# Patient Record
Sex: MALE | Race: WHITE | Employment: UNEMPLOYED | URBAN - METROPOLITAN AREA
[De-identification: names, ages, dates, MRNs, and addresses within clinical notes are randomized per-mention and may not be internally consistent; named-entity substitution may affect disease eponyms.]

---

## 2017-06-26 PROBLEM — F15.10 METHAMPHETAMINE ABUSE (HCC): Status: ACTIVE | Noted: 2017-06-26

## 2017-06-26 PROBLEM — F60.2 ANTISOCIAL PERSONALITY DISORDER (HCC): Chronic | Status: ACTIVE | Noted: 2017-06-26

## 2017-06-26 PROBLEM — F31.62 BIPOLAR DISORDER, CURRENT EPISODE MIXED, MODERATE (HCC): Status: ACTIVE | Noted: 2017-06-26

## 2017-06-26 PROBLEM — F39 MOOD DISORDER (HCC): Chronic | Status: ACTIVE | Noted: 2017-06-26

## 2018-07-17 ENCOUNTER — HOSPITAL ENCOUNTER (INPATIENT)
Age: 33
LOS: 1 days | Discharge: HOME | End: 2018-07-19
Attending: EMERGENCY MEDICINE | Admitting: PSYCHIATRY & NEUROLOGY
Payer: MEDICAID

## 2018-07-17 DIAGNOSIS — T50.902A INTENTIONAL DRUG OVERDOSE, INITIAL ENCOUNTER (HCC): ICD-10-CM

## 2018-07-17 DIAGNOSIS — R45.851 SUICIDAL IDEATION: Primary | ICD-10-CM

## 2018-07-17 LAB
BASOPHILS ABSOLUTE: 0.1 K/UL (ref 0–0.2)
BASOPHILS RELATIVE PERCENT: 0.9 %
EOSINOPHILS ABSOLUTE: 0.1 K/UL (ref 0–0.6)
EOSINOPHILS RELATIVE PERCENT: 1.1 %
HCT VFR BLD CALC: 43.9 % (ref 40.5–52.5)
HEMOGLOBIN: 15 G/DL (ref 13.5–17.5)
LYMPHOCYTES ABSOLUTE: 4 K/UL (ref 1–5.1)
LYMPHOCYTES RELATIVE PERCENT: 35.2 %
MCH RBC QN AUTO: 32.1 PG (ref 26–34)
MCHC RBC AUTO-ENTMCNC: 34.2 G/DL (ref 31–36)
MCV RBC AUTO: 93.7 FL (ref 80–100)
MONOCYTES ABSOLUTE: 0.8 K/UL (ref 0–1.3)
MONOCYTES RELATIVE PERCENT: 7.1 %
NEUTROPHILS ABSOLUTE: 6.4 K/UL (ref 1.7–7.7)
NEUTROPHILS RELATIVE PERCENT: 55.7 %
PDW BLD-RTO: 13.3 % (ref 12.4–15.4)
PLATELET # BLD: 269 K/UL (ref 135–450)
PMV BLD AUTO: 8.3 FL (ref 5–10.5)
RBC # BLD: 4.68 M/UL (ref 4.2–5.9)
WBC # BLD: 11.4 K/UL (ref 4–11)

## 2018-07-17 PROCEDURE — G0480 DRUG TEST DEF 1-7 CLASSES: HCPCS

## 2018-07-17 PROCEDURE — 99285 EMERGENCY DEPT VISIT HI MDM: CPT

## 2018-07-17 PROCEDURE — 81003 URINALYSIS AUTO W/O SCOPE: CPT

## 2018-07-17 PROCEDURE — 80307 DRUG TEST PRSMV CHEM ANLYZR: CPT

## 2018-07-17 PROCEDURE — 80053 COMPREHEN METABOLIC PANEL: CPT

## 2018-07-17 PROCEDURE — 85025 COMPLETE CBC W/AUTO DIFF WBC: CPT

## 2018-07-18 LAB
A/G RATIO: 1.1 (ref 1.1–2.2)
A/G RATIO: 1.3 (ref 1.1–2.2)
ACETAMINOPHEN LEVEL: <5 UG/ML (ref 10–30)
ALBUMIN SERPL-MCNC: 3.8 G/DL (ref 3.4–5)
ALBUMIN SERPL-MCNC: 3.9 G/DL (ref 3.4–5)
ALP BLD-CCNC: 121 U/L (ref 40–129)
ALP BLD-CCNC: 125 U/L (ref 40–129)
ALT SERPL-CCNC: 438 U/L (ref 10–40)
ALT SERPL-CCNC: 451 U/L (ref 10–40)
AMPHETAMINE SCREEN, URINE: NORMAL
ANION GAP SERPL CALCULATED.3IONS-SCNC: 14 MMOL/L (ref 3–16)
ANION GAP SERPL CALCULATED.3IONS-SCNC: 14 MMOL/L (ref 3–16)
AST SERPL-CCNC: 295 U/L (ref 15–37)
AST SERPL-CCNC: 307 U/L (ref 15–37)
BARBITURATE SCREEN URINE: NORMAL
BENZODIAZEPINE SCREEN, URINE: NORMAL
BILIRUB SERPL-MCNC: <0.2 MG/DL (ref 0–1)
BILIRUB SERPL-MCNC: <0.2 MG/DL (ref 0–1)
BILIRUBIN URINE: NEGATIVE
BLOOD, URINE: NEGATIVE
BUN BLDV-MCNC: 12 MG/DL (ref 7–20)
BUN BLDV-MCNC: 13 MG/DL (ref 7–20)
CALCIUM SERPL-MCNC: 8.6 MG/DL (ref 8.3–10.6)
CALCIUM SERPL-MCNC: 8.7 MG/DL (ref 8.3–10.6)
CANNABINOID SCREEN URINE: NORMAL
CHLORIDE BLD-SCNC: 104 MMOL/L (ref 99–110)
CHLORIDE BLD-SCNC: 106 MMOL/L (ref 99–110)
CLARITY: CLEAR
CO2: 21 MMOL/L (ref 21–32)
CO2: 22 MMOL/L (ref 21–32)
COCAINE METABOLITE SCREEN URINE: NORMAL
COLOR: YELLOW
CREAT SERPL-MCNC: 1.4 MG/DL (ref 0.9–1.3)
CREAT SERPL-MCNC: 1.4 MG/DL (ref 0.9–1.3)
ETHANOL: 171 MG/DL (ref 0–0.08)
ETHANOL: 224 MG/DL (ref 0–0.08)
ETHANOL: 50 MG/DL (ref 0–0.08)
GFR AFRICAN AMERICAN: >60
GFR AFRICAN AMERICAN: >60
GFR NON-AFRICAN AMERICAN: 59
GFR NON-AFRICAN AMERICAN: 59
GLOBULIN: 3.1 G/DL
GLOBULIN: 3.4 G/DL
GLUCOSE BLD-MCNC: 103 MG/DL (ref 70–99)
GLUCOSE BLD-MCNC: 97 MG/DL (ref 70–99)
GLUCOSE URINE: NEGATIVE MG/DL
KETONES, URINE: NEGATIVE MG/DL
LEUKOCYTE ESTERASE, URINE: NEGATIVE
Lab: NORMAL
METHADONE SCREEN, URINE: NORMAL
MICROSCOPIC EXAMINATION: NORMAL
NITRITE, URINE: NEGATIVE
OPIATE SCREEN URINE: NORMAL
OXYCODONE URINE: NORMAL
PH UA: 6
PH UA: 6
PHENCYCLIDINE SCREEN URINE: NORMAL
POTASSIUM SERPL-SCNC: 4.3 MMOL/L (ref 3.5–5.1)
POTASSIUM SERPL-SCNC: 4.6 MMOL/L (ref 3.5–5.1)
PROPOXYPHENE SCREEN: NORMAL
PROTEIN UA: NEGATIVE MG/DL
SALICYLATE, SERUM: <0.3 MG/DL (ref 15–30)
SODIUM BLD-SCNC: 140 MMOL/L (ref 136–145)
SODIUM BLD-SCNC: 141 MMOL/L (ref 136–145)
SPECIFIC GRAVITY UA: 1.01
TOTAL PROTEIN: 7 G/DL (ref 6.4–8.2)
TOTAL PROTEIN: 7.2 G/DL (ref 6.4–8.2)
URINE REFLEX TO CULTURE: NORMAL
URINE TYPE: NORMAL
UROBILINOGEN, URINE: 0.2 E.U./DL

## 2018-07-18 PROCEDURE — G0480 DRUG TEST DEF 1-7 CLASSES: HCPCS

## 2018-07-18 PROCEDURE — 80053 COMPREHEN METABOLIC PANEL: CPT

## 2018-07-18 PROCEDURE — 93005 ELECTROCARDIOGRAM TRACING: CPT | Performed by: EMERGENCY MEDICINE

## 2018-07-18 PROCEDURE — 93010 ELECTROCARDIOGRAM REPORT: CPT | Performed by: INTERNAL MEDICINE

## 2018-07-18 PROCEDURE — 6360000002 HC RX W HCPCS

## 2018-07-18 PROCEDURE — 1240000000 HC EMOTIONAL WELLNESS R&B

## 2018-07-18 PROCEDURE — 6360000002 HC RX W HCPCS: Performed by: EMERGENCY MEDICINE

## 2018-07-18 RX ORDER — LORAZEPAM 2 MG/1
2 TABLET ORAL
Status: DISCONTINUED | OUTPATIENT
Start: 2018-07-18 | End: 2018-07-18

## 2018-07-18 RX ORDER — NICOTINE 21 MG/24HR
1 PATCH, TRANSDERMAL 24 HOURS TRANSDERMAL DAILY
Status: DISCONTINUED | OUTPATIENT
Start: 2018-07-18 | End: 2018-07-19 | Stop reason: HOSPADM

## 2018-07-18 RX ORDER — LORAZEPAM 2 MG/1
2 TABLET ORAL
Status: DISCONTINUED | OUTPATIENT
Start: 2018-07-18 | End: 2018-07-19 | Stop reason: HOSPADM

## 2018-07-18 RX ORDER — OLANZAPINE 10 MG/1
10 TABLET ORAL EVERY 8 HOURS PRN
Status: DISCONTINUED | OUTPATIENT
Start: 2018-07-18 | End: 2018-07-19 | Stop reason: HOSPADM

## 2018-07-18 RX ORDER — LORAZEPAM 2 MG/ML
INJECTION INTRAMUSCULAR
Status: COMPLETED
Start: 2018-07-18 | End: 2018-07-18

## 2018-07-18 RX ORDER — HALOPERIDOL 5 MG/ML
INJECTION INTRAMUSCULAR
Status: DISCONTINUED
Start: 2018-07-18 | End: 2018-07-18 | Stop reason: WASHOUT

## 2018-07-18 RX ORDER — LORAZEPAM 2 MG/1
4 TABLET ORAL
Status: DISCONTINUED | OUTPATIENT
Start: 2018-07-18 | End: 2018-07-19 | Stop reason: HOSPADM

## 2018-07-18 RX ORDER — OLANZAPINE 10 MG/1
10 INJECTION, POWDER, LYOPHILIZED, FOR SOLUTION INTRAMUSCULAR EVERY 8 HOURS PRN
Status: DISCONTINUED | OUTPATIENT
Start: 2018-07-18 | End: 2018-07-19 | Stop reason: HOSPADM

## 2018-07-18 RX ORDER — LORAZEPAM 1 MG/1
1 TABLET ORAL
Status: DISCONTINUED | OUTPATIENT
Start: 2018-07-18 | End: 2018-07-19 | Stop reason: HOSPADM

## 2018-07-18 RX ORDER — ACETAMINOPHEN 325 MG/1
650 TABLET ORAL EVERY 4 HOURS PRN
Status: DISCONTINUED | OUTPATIENT
Start: 2018-07-18 | End: 2018-07-19 | Stop reason: HOSPADM

## 2018-07-18 RX ORDER — DIPHENHYDRAMINE HYDROCHLORIDE 50 MG/ML
INJECTION INTRAMUSCULAR; INTRAVENOUS
Status: DISCONTINUED
Start: 2018-07-18 | End: 2018-07-18 | Stop reason: WASHOUT

## 2018-07-18 RX ORDER — LORAZEPAM 2 MG/ML
1 INJECTION INTRAMUSCULAR ONCE
Status: COMPLETED | OUTPATIENT
Start: 2018-07-18 | End: 2018-07-18

## 2018-07-18 RX ORDER — HALOPERIDOL 5 MG/ML
10 INJECTION INTRAMUSCULAR ONCE
Status: COMPLETED | OUTPATIENT
Start: 2018-07-18 | End: 2018-07-18

## 2018-07-18 RX ORDER — HYDROXYZINE PAMOATE 50 MG/1
50 CAPSULE ORAL 3 TIMES DAILY PRN
Status: DISCONTINUED | OUTPATIENT
Start: 2018-07-18 | End: 2018-07-19 | Stop reason: HOSPADM

## 2018-07-18 RX ADMIN — HALOPERIDOL LACTATE 10 MG: 5 INJECTION, SOLUTION INTRAMUSCULAR at 00:46

## 2018-07-18 RX ADMIN — LORAZEPAM 1 MG: 2 INJECTION INTRAMUSCULAR; INTRAVENOUS at 00:46

## 2018-07-18 RX ADMIN — LORAZEPAM 1 MG: 2 INJECTION INTRAMUSCULAR at 00:46

## 2018-07-18 ASSESSMENT — SLEEP AND FATIGUE QUESTIONNAIRES
DIFFICULTY ARISING: NO
AVERAGE NUMBER OF SLEEP HOURS: 3
RESTFUL SLEEP: NO
DO YOU USE A SLEEP AID: NO
DIFFICULTY STAYING ASLEEP: YES
DIFFICULTY FALLING ASLEEP: YES
SLEEP PATTERN: DIFFICULTY FALLING ASLEEP;INSOMNIA;DISTURBED/INTERRUPTED SLEEP;RESTLESSNESS
DO YOU HAVE DIFFICULTY SLEEPING: YES

## 2018-07-18 ASSESSMENT — LIFESTYLE VARIABLES: HISTORY_ALCOHOL_USE: YES

## 2018-07-18 NOTE — H&P
Spoke with patient at bedside and explained the purpose of my visit. Pt stated he did not want a physical exam and refused to give any information regarding medical history. Explained to patient that should he have any concerns during his admission to [facility] that he may contact his nurse and have them contact this provider. Should any medical concerns arise during his admission, I may contact us at any time.     Para Ora SMITH 5:11 PM 7/18/2018

## 2018-07-18 NOTE — PROGRESS NOTES
Restraint 1 Hour RN assessment      Catina Shay was placed in Leather/Alternative Bilateral Wrist and Leather/Alternative Bilateral Ankle restraints at 0050 due to Behavioral management problems. Currently patient is yelling out and belligerent. He is trying to sit up and break out of restraints. He has reacted poorly to being placed in restraints. Patient medical history includes       Diagnosis Date    ADHD     Anxiety     BIPOLAR AFFECTIVE DISORDER     Depression     Hepatitis C     IV drug user     Kidney infection     with consideration given to all history in regards to restraint risk. Current mental status alert, oriented to self. Pt is drunk. . At this time the patient  does meet criteria for continued restraint AEB Combative behavior. Threatening to shoot the . The room has been assessed for safety and potentially harmful objects. Patient has been assessed for comfort and current needs. Respirations easy. Skin Skin color, tempature, turgor normal. No rashes or lesions. Current vitals include BP (!) 158/100   Pulse 105   Temp 97.6 °F (36.4 °C) (Oral)   Resp 16   Ht 6' 2\" (1.88 m)   Wt 200 lb (90.7 kg)   SpO2 95%   BMI 25.68 kg/m²   .     Most recent lab values include:   Admission on 07/17/2018   Component Date Value Ref Range Status    WBC 07/17/2018 11.4* 4.0 - 11.0 K/uL Final    RBC 07/17/2018 4.68  4.20 - 5.90 M/uL Final    Hemoglobin 07/17/2018 15.0  13.5 - 17.5 g/dL Final    Hematocrit 07/17/2018 43.9  40.5 - 52.5 % Final    MCV 07/17/2018 93.7  80.0 - 100.0 fL Final    MCH 07/17/2018 32.1  26.0 - 34.0 pg Final    MCHC 07/17/2018 34.2  31.0 - 36.0 g/dL Final    RDW 07/17/2018 13.3  12.4 - 15.4 % Final    Platelets 86/37/6748 269  135 - 450 K/uL Final    MPV 07/17/2018 8.3  5.0 - 10.5 fL Final    Neutrophils % 07/17/2018 55.7  % Final    Lymphocytes % 07/17/2018 35.2  % Final    Monocytes % 07/17/2018 7.1  % Final    Eosinophils % 07/17/2018 1.1

## 2018-07-18 NOTE — ED TRIAGE NOTES
Chief Complaint   Patient presents with    Suicidal     Pt brought in by EMS/police on hold for suicidal attempt. Pt states that he was trying to kill himself tonight because his dad  a year ago from lung cancer. Pt states that he took 30, 800mg of ibuprofen and drank 15 beers. Pt states that he just wanted to die.

## 2018-07-18 NOTE — ED PROVIDER NOTES
Triage Chief Complaint:   Suicidal (Pt brought in by EMS/police on hold for suicidal attempt. Pt states that he was trying to kill himself tonight because his dad  a year ago from lung cancer. Pt states that he took 30, 800mg of ibuprofen and drank 15 beers. Pt states that he just wanted to die. )      [place]:  Migel Dawson is a 34 y.o. male that presents after an overdose of ibuprofen. He states that he took this 2 hours prior to arrival in the emergency department in a suicide attempt because this is the anniversary of his father's death ago. Patient has had suicide attempts in the past and is also an alcoholic and drug addict. He has hepatitis C from heroin use but states that he has not used heroin over the past 4 years but does drink 24 beers a day. He is nauseous but has not vomited did not have a fever and was asymptomatic at time of exam though he did appear somewhat inebriated and was very jovial.  Patient is hypertensive but not taking blood pressure medicines and neither is he taking his bipolar medications. ROS:  Review of systems was reviewed for 10 systems and is otherwise negative except as in the [address]    Past Medical History:   Diagnosis Date    ADHD     Anxiety     BIPOLAR AFFECTIVE DISORDER     Depression     Hepatitis C     IV drug user     Kidney infection      Past Surgical History:   Procedure Laterality Date    KIDNEY SURGERY      \"BORN WITHOUT ANY URETERAL VALVES. \"    TESTICLE REMOVAL      left     Family History   Problem Relation Age of Onset    Depression Father     Mental Illness Father     Cancer Father     Cancer Mother      Social History     Social History    Marital status: Single     Spouse name: N/A    Number of children: N/A    Years of education: N/A     Occupational History    unemployed      Social History Main Topics    Smoking status: Current Every Day Smoker     Packs/day: 1.00     Years: 12.00     Types: Cigarettes    Smokeless tobacco: Never Used      Comment: handouts given    Alcohol use 2.4 - 3.0 oz/week     4 - 5 Cans of beer per week    Drug use: No      Comment: Meth about once a month.  Sexual activity: Yes     Partners: Female     Other Topics Concern    Not on file     Social History Narrative    No narrative on file     No current facility-administered medications for this encounter. Current Outpatient Prescriptions   Medication Sig Dispense Refill    OLANZapine zydis (ZYPREXA) 10 MG disintegrating tablet Take 1 tablet by mouth 2 times daily 60 tablet 0     No Known Allergies  Nursing Notes Reviewed    Physical Exam:  ED Triage Vitals [07/17/18 2314]   Enc Vitals Group      BP (!) 158/100      Pulse 105      Resp 16      Temp 97.6 °F (36.4 °C)      Temp Source Oral      SpO2 95 %      Weight 200 lb (90.7 kg)      Height 6' 2\" (1.88 m)      Head Circumference       Peak Flow       Pain Score       Pain Loc       Pain Edu? Excl. in [address]? GENERAL APPEARANCE: A Disheveled 82-year-old white male in   HEAD: Normocephalic, atraumatic  EYES: Sclera anicteric.no conjunctival injection, PERRL EOM's  Intact  ENT: Mucous membranes moist, no nasal discharge, no stridor, dysphonia, dysphagia or trismus  HEART: RRR without rubs murmurs or gallops  LUNGS:  Clear good air movement no wheezing no retraction or accessory muscle use,  ABDOMEN: Soft, non-tender to palpation, no guarding or rebound. , no mass or distention and no hepatosplenomegaly. Clearly no evidence of an acute abdomen or peritoneal signs at time of exam  EXTREMITIES: No acute deformities, no peripheral edema  SKIN: Warm and dry.  Normal color, no rash,  capillary refill less than 2 seconds  MENTAL STATUS: Alert, oriented, interactive,   PSYCHIATRIC:   Level of consciousness: awake, and alert, slurred speech  Appearance: Disheveled but otherwise well-developed, well-nourished  Behavior & Motor: no abnormalities noted  Attitude: cooperative, attentive and good eye contact  Speech: spontaneous, normal rate, volume and articulation, but slightly slurred speech  Mood: dysthymic  Affect: mood congruent   Anxiety: mild  Hallucinations: Absent  Thought processes: Concentration & Attention were age appropriate  Thought content:    -  Delusions: none   -  OCD: none   -  Insight: poor insight and judgment    -  Cognition: oriented to person, place, and time    -  Fund of Knowledge: average IQ:average    -  Memory: intact    -  Suicide: Plans to kill himself by overdose   -  Homicide: no homicidal ideation  Sleep: no sleep difficulties  Appetite: normal         Nursing note and vital signs reviewed     I have reviewed and interpreted all of the currently available lab results from this visit (if applicable):  Results for orders placed or performed during the hospital encounter of 07/17/18   CBC auto differential   Result Value Ref Range    WBC 11.4 (H) 4.0 - 11.0 K/uL    RBC 4.68 4.20 - 5.90 M/uL    Hemoglobin 15.0 13.5 - 17.5 g/dL    Hematocrit 43.9 40.5 - 52.5 %    MCV 93.7 80.0 - 100.0 fL    MCH 32.1 26.0 - 34.0 pg    MCHC 34.2 31.0 - 36.0 g/dL    RDW 13.3 12.4 - 15.4 %    Platelets 223 817 - 687 K/uL    MPV 8.3 5.0 - 10.5 fL    Neutrophils % 55.7 %    Lymphocytes % 35.2 %    Monocytes % 7.1 %    Eosinophils % 1.1 %    Basophils % 0.9 %    Neutrophils # 6.4 1.7 - 7.7 K/uL    Lymphocytes # 4.0 1.0 - 5.1 K/uL    Monocytes # 0.8 0.0 - 1.3 K/uL    Eosinophils # 0.1 0.0 - 0.6 K/uL    Basophils # 0.1 0.0 - 0.2 K/uL   Comprehensive metabolic panel   Result Value Ref Range    Sodium 140 136 - 145 mmol/L    Potassium 4.6 3.5 - 5.1 mmol/L    Chloride 104 99 - 110 mmol/L    CO2 22 21 - 32 mmol/L    Anion Gap 14 3 - 16    Glucose 103 (H) 70 - 99 mg/dL    BUN 12 7 - 20 mg/dL    CREATININE 1.4 (H) 0.9 - 1.3 mg/dL    GFR Non- 59 (A) >60    GFR African American >60 >60    Calcium 8.6 8.3 - 10.6 mg/dL    Total Protein 7.0 6.4 - 8.2 g/dL    Alb 3.9 3.4 - 5.0 g/dL    Albumin/Globulin Ratio 1.3 1.1 - 2.2    Total Bilirubin <0.2 0.0 - 1.0 mg/dL    Alkaline Phosphatase 125 40 - 129 U/L     (H) 10 - 40 U/L     (H) 15 - 37 U/L    Globulin 3.1 g/dL   Ethanol   Result Value Ref Range    Ethanol Lvl 224 mg/dL   Acetaminophen level   Result Value Ref Range    Acetaminophen Level <5 (L) 10 - 30 ug/mL   Salicylate   Result Value Ref Range    Salicylate, Serum <1.1 (L) 15.0 - 30.0 mg/dL   Urine, reflex to culture   Result Value Ref Range    Color, UA Yellow Straw/Yellow    Clarity, UA Clear Clear    Glucose, Ur Negative Negative mg/dL    Bilirubin Urine Negative Negative    Ketones, Urine Negative Negative mg/dL    Specific Gravity, UA 1.010 1.005 - 1.030    Blood, Urine Negative Negative    pH, UA 6.0 5.0 - 8.0    Protein, UA Negative Negative mg/dL    Urobilinogen, Urine 0.2 <2.0 E.U./dL    Nitrite, Urine Negative Negative    Leukocyte Esterase, Urine Negative Negative    Microscopic Examination Not Indicated     Urine Reflex to Culture Not Indicated     Urine Type Not Specified    Drug screen multi urine   Result Value Ref Range    Amphetamine Screen, Urine Neg Negative <1000ng/mL    Barbiturate Screen, Ur Neg Negative <200 ng/mL    Benzodiazepine Screen, Urine Neg Negative <200 ng/mL    Cannabinoid Scrn, Ur Neg Negative <50 ng/mL    Cocaine Metabolite Screen, Urine Neg Negative <300 ng/mL    Opiate Scrn, Ur Neg Negative <300 ng/mL    PCP Screen, Urine Neg Negative <25 ng/mL    Methadone Screen, Urine Neg Negative <300 ng/mL    Propoxyphene Scrn, Ur Neg Negative <300 ng/mL    pH, UA 6.0     Drug Screen Comment: see below     Oxycodone Urine Neg Negative <100 ng/ml   Comprehensive metabolic panel   Result Value Ref Range    Sodium 141 136 - 145 mmol/L    Potassium 4.3 3.5 - 5.1 mmol/L    Chloride 106 99 - 110 mmol/L    CO2 21 21 - 32 mmol/L    Anion Gap 14 3 - 16    Glucose 97 70 - 99 mg/dL    BUN 13 7 - 20 mg/dL    CREATININE 1.4 (H) 0.9 - 1.3 mg/dL    GFR Non- 59 (A) >60 specified. Disposition medications (if applicable):  New Prescriptions    No medications on file       Comment: Please note this report has been produced using speech recognition software and may contain errors related to that system including errors in grammar, punctuation, and spelling, as well as words and phrases that may be inappropriate. If there are any questions or concerns please feel free to contact the dictating provider for clarification.       (Please note that portions of this note may have been completed with a voice recognition program. Efforts were made to edit the dictations but occasionally words are mis-transcribed.)    MD Casper Goss MD  07/18/18 0302       Casper Ribeiro MD  07/18/18 4261

## 2018-07-18 NOTE — ED NOTES
Patient awoken up to bathroom. Will order patient breakfast. Will continue to monitor patient. Will recheck blood alcohol level once patient eats. Will than evaluate patient.       Kell Dupree RN  07/18/18 6656
Patient reports he now wants to go home. Explained to patient the doctor wants him to stay. This nurse clarified after reading in history about drug and alcohol use if he drinks daily. Patient at first said no than stated yes. Patient reports drinking about 15 beers a day. Patient denies any serious withdrawal symptoms. Reports he has had shakes in past. Will notify Dr. Erasmo Gonzalez.       Dalton Kennedy RN  07/18/18 8070
Pt in bed resting at this time ; resps even and easy. Will continue to monitor.       Jarred Shipley  07/18/18 1090
Pt in bed sleeping at this time, no signs of distress. Will continue to monitor.       Wolfgang Jesi  07/18/18 0701
Pt resting comfortably at this time. Will continue to monitor.       [address], [place]  07/18/18 5564
Pt transported via wheelchair to [facility]. Pt report given to Giuliano Salter RN. Security at bedside. Pt uncooperative with staff.       Shawna Stewart RN  07/18/18 5174
Writer spoke with poison control regarding patient. Recommend that patient be observed for at least 6 hours. Pt is also to have repeat CMP in 4 hours. Will make Dr. Rob Denis aware.       Laure Ennis, MARIELLE  07/18/18 0000
place   [x]  Medication noncompliance   [x]  No collateral information to support safety   []      PROTECTIVE FACTORS:  [x] Age >25 and <55  [] Female gender   [] Denies depression  [] Denies suicidal ideation  [] Does not have lethal plan   [x] Does not have access to guns or weapons  [] Patient is verbally rolan for safety  [] No prior suicide attempts  [x] No active substance abuse- per patient  [] Patient has social or family support  [x] No active psychosis or cognitive dysfunction  [x] Physically healthy  [] Has outpatient services in place  [] Compliant with recommended medications  [] Collateral information from  supports patient safety   [] Patient is future oriented   []        Clinical Summary:    Patient presents to ED on a SOB which states \" On 7/7/18 at approximately 2230 hours I responded to [address] [place] [address] for a subject threatening suicide. The male , Shannan Belcher was on the front porch drinking. The male, Myah Tyson, stated that he is depressed because the anniversary of his father's death is coming. Myah Tyson stated multiple times that he wanted to die and took 25 800 mg Ibuprofen and stated he was attempting to kill himself. Myah Tyson also advised that wants to kill himself because he is a meth addict and if he kills himself he wont relapse. Patient was clinically sober at the time of the evaluation. Patient reports he is still suicidal. Reports he is depressed. Patient does not have currently plan however OD last night. Reports he lost his dad last year and he has no other support. Patient poor historian and not wanting to talk with this nurse much. Reports he is currently living with his mom whom he does not have a good relationship with. Reports he does not have much support. Reports he does not work and is on disability. Reports he currently is not using drugs. Patient not willing to talk about his past drug use. Patient was evaluated and offered supportive counseling.   Will continue to

## 2018-07-19 VITALS
BODY MASS INDEX: 25.67 KG/M2 | TEMPERATURE: 97.8 F | DIASTOLIC BLOOD PRESSURE: 76 MMHG | WEIGHT: 200 LBS | SYSTOLIC BLOOD PRESSURE: 111 MMHG | RESPIRATION RATE: 16 BRPM | HEIGHT: 74 IN | HEART RATE: 95 BPM | OXYGEN SATURATION: 99 %

## 2018-07-19 LAB
AMMONIA: 44 UMOL/L (ref 16–60)
AMYLASE: 59 U/L (ref 25–115)
EKG ATRIAL RATE: 88 BPM
EKG DIAGNOSIS: NORMAL
EKG P AXIS: 53 DEGREES
EKG P-R INTERVAL: 132 MS
EKG Q-T INTERVAL: 334 MS
EKG QRS DURATION: 90 MS
EKG QTC CALCULATION (BAZETT): 404 MS
EKG R AXIS: 38 DEGREES
EKG T AXIS: 43 DEGREES
EKG VENTRICULAR RATE: 88 BPM
LIPASE: 21 U/L (ref 13–60)
RPR: NORMAL
TSH SERPL DL<=0.05 MIU/L-ACNC: 2 UIU/ML (ref 0.27–4.2)
VITAMIN B-12: 996 PG/ML (ref 211–911)

## 2018-07-19 PROCEDURE — 86592 SYPHILIS TEST NON-TREP QUAL: CPT

## 2018-07-19 PROCEDURE — 5130000000 HC BRIDGE APPOINTMENT

## 2018-07-19 PROCEDURE — 84443 ASSAY THYROID STIM HORMONE: CPT

## 2018-07-19 PROCEDURE — 36415 COLL VENOUS BLD VENIPUNCTURE: CPT

## 2018-07-19 PROCEDURE — 99223 1ST HOSP IP/OBS HIGH 75: CPT | Performed by: PSYCHIATRY & NEUROLOGY

## 2018-07-19 PROCEDURE — 82140 ASSAY OF AMMONIA: CPT

## 2018-07-19 PROCEDURE — 82607 VITAMIN B-12: CPT

## 2018-07-19 PROCEDURE — 82150 ASSAY OF AMYLASE: CPT

## 2018-07-19 PROCEDURE — 83690 ASSAY OF LIPASE: CPT

## 2018-07-19 RX ORDER — OLANZAPINE 10 MG/1
10 TABLET ORAL NIGHTLY
Qty: 30 TABLET | Refills: 0 | Status: SHIPPED | OUTPATIENT
Start: 2018-07-19 | End: 2019-09-15

## 2018-07-19 ASSESSMENT — SLEEP AND FATIGUE QUESTIONNAIRES
SLEEP PATTERN: DIFFICULTY FALLING ASLEEP;DIFFICULTY ARISING;DISTURBED/INTERRUPTED SLEEP;INSOMNIA
DIFFICULTY FALLING ASLEEP: YES
RESTFUL SLEEP: NO
DO YOU HAVE DIFFICULTY SLEEPING: YES
AVERAGE NUMBER OF SLEEP HOURS: 3
DIFFICULTY ARISING: NO
DIFFICULTY STAYING ASLEEP: YES
DO YOU USE A SLEEP AID: NO

## 2018-07-19 ASSESSMENT — LIFESTYLE VARIABLES: HISTORY_ALCOHOL_USE: YES

## 2018-07-19 NOTE — DISCHARGE SUMMARY
but denies any current legal issues. Denies any abuse or trauma growing up.  [facility] Course Pt was admitted for si and s/o sa via OD of ibuprofen. Pt was denying si and attributes his behaviors to drinking and been intoxicated. Pt is not holdable or probatebale and will be discharge home. Pt was restarted on his meds since off for 2 months and he stated that he was doing well. Patient stabilized on meds and milieu treatment. Patient was discharged to home to continue recovery in the community.    PE: (reviewed) and labs (see medical H&PE)  Labs:    Admission on 07/17/2018, Discharged on 07/19/2018   Component Date Value Ref Range Status    WBC 07/17/2018 11.4* 4.0 - 11.0 K/uL Final    RBC 07/17/2018 4.68  4.20 - 5.90 M/uL Final    Hemoglobin 07/17/2018 15.0  13.5 - 17.5 g/dL Final    Hematocrit 07/17/2018 43.9  40.5 - 52.5 % Final    MCV 07/17/2018 93.7  80.0 - 100.0 fL Final    MCH 07/17/2018 32.1  26.0 - 34.0 pg Final    MCHC 07/17/2018 34.2  31.0 - 36.0 g/dL Final    RDW 07/17/2018 13.3  12.4 - 15.4 % Final    Platelets 64/38/3296 269  135 - 450 K/uL Final    MPV 07/17/2018 8.3  5.0 - 10.5 fL Final    Neutrophils % 07/17/2018 55.7  % Final    Lymphocytes % 07/17/2018 35.2  % Final    Monocytes % 07/17/2018 7.1  % Final    Eosinophils % 07/17/2018 1.1  % Final    Basophils % 07/17/2018 0.9  % Final    Neutrophils # 07/17/2018 6.4  1.7 - 7.7 K/uL Final    Lymphocytes # 07/17/2018 4.0  1.0 - 5.1 K/uL Final    Monocytes # 07/17/2018 0.8  0.0 - 1.3 K/uL Final    Eosinophils # 07/17/2018 0.1  0.0 - 0.6 K/uL Final    Basophils # 07/17/2018 0.1  0.0 - 0.2 K/uL Final    Sodium 07/17/2018 140  136 - 145 mmol/L Final    Potassium 07/17/2018 4.6  3.5 - 5.1 mmol/L Final    Chloride 07/17/2018 104  99 - 110 mmol/L Final    CO2 07/17/2018 22  21 - 32 mmol/L Final    Anion Gap 07/17/2018 14  3 - 16 Final    Glucose 07/17/2018 103* 70 - 99 mg/dL Final    BUN 07/17/2018 12  7 - 20 mg/dL Final    CREATININE 07/17/2018 1.4* 0.9 - 1.3 mg/dL Final    GFR Non- 07/17/2018 59* >60 Final    Comment: >60 mL/min/1.73m2 EGFR, calc. for ages 25 and older using the  MDRD formula (not corrected for weight), is valid for stable  renal function.  GFR  07/17/2018 >60  >60 Final    Comment: Chronic Kidney Disease: less than 60 ml/min/1.73 sq.m. Kidney Failure: less than 15 ml/min/1.73 sq.m. Results valid for patients 18 years and older.  Calcium 07/17/2018 8.6  8.3 - 10.6 mg/dL Final    Total Protein 07/17/2018 7.0  6.4 - 8.2 g/dL Final    Alb 07/17/2018 3.9  3.4 - 5.0 g/dL Final    Albumin/Globulin Ratio 07/17/2018 1.3  1.1 - 2.2 Final    Total Bilirubin 07/17/2018 <0.2  0.0 - 1.0 mg/dL Final    Alkaline Phosphatase 07/17/2018 125  40 - 129 U/L Final    ALT 07/17/2018 451* 10 - 40 U/L Final    AST 07/17/2018 307* 15 - 37 U/L Final    Comment: Specimen hemolysis has exceeded the interference as defined by Roche. Value may be falsely increased. Suggest reorder and recollection if  clinically indicated.       Globulin 07/17/2018 3.1  g/dL Final    Ethanol Lvl 07/17/2018 224  mg/dL Final    Comment:    None Detected  Conversion factor:  100 mg/dl = .100 g/dl  For Medical Purposes Only      Acetaminophen Level 07/17/2018 <5* 10 - 30 ug/mL Final    Comment: Therapeutic Range: 10.0-30.0 ug/mL  Toxic: >879.8 ug/mL      Salicylate, Serum 32/00/3996 <0.3* 15.0 - 30.0 mg/dL Final    Comment: Therapeutic Range: 15.0-30.0 mg/dL  Toxic: >30.0 mg/dL      Color, UA 07/17/2018 Yellow  Straw/Yellow Final    Clarity, UA 07/17/2018 Clear  Clear Final    Glucose, Ur 07/17/2018 Negative  Negative mg/dL Final    Bilirubin Urine 07/17/2018 Negative  Negative Final    Ketones, Urine 07/17/2018 Negative  Negative mg/dL Final    Specific Gravity, UA 07/17/2018 1.010  1.005 - 1.030 Final    Blood, Urine 07/17/2018 Negative  Negative Final    pH, UA 07/17/2018 6.0  5.0 - 8.0 Final Final    Q-T Interval 07/18/2018 334  ms Final    QTc Calculation (Bazett) 07/18/2018 404  ms Final    P Axis 07/18/2018 53  degrees Final    R Axis 07/18/2018 38  degrees Final    T Axis 07/18/2018 43  degrees Final    Diagnosis 07/18/2018 Normal sinus rhythmNonspecific ST abnormalityAbnormal ECGNo previous ECGs availableConfirmed by JERRELL DIAZ MD ([id number]) on 7/18/2018 9:18:15 AM   Final    Sodium 07/18/2018 141  136 - 145 mmol/L Final    Potassium 07/18/2018 4.3  3.5 - 5.1 mmol/L Final    Chloride 07/18/2018 106  99 - 110 mmol/L Final    CO2 07/18/2018 21  21 - 32 mmol/L Final    Anion Gap 07/18/2018 14  3 - 16 Final    Glucose 07/18/2018 97  70 - 99 mg/dL Final    BUN 07/18/2018 13  7 - 20 mg/dL Final    CREATININE 07/18/2018 1.4* 0.9 - 1.3 mg/dL Final    GFR Non- 07/18/2018 59* >60 Final    Comment: >60 mL/min/1.73m2 EGFR, calc. for ages 25 and older using the  MDRD formula (not corrected for weight), is valid for stable  renal function.  GFR  07/18/2018 >60  >60 Final    Comment: Chronic Kidney Disease: less than 60 ml/min/1.73 sq.m. Kidney Failure: less than 15 ml/min/1.73 sq.m. Results valid for patients 18 years and older.       Calcium 07/18/2018 8.7  8.3 - 10.6 mg/dL Final    Total Protein 07/18/2018 7.2  6.4 - 8.2 g/dL Final    Alb 07/18/2018 3.8  3.4 - 5.0 g/dL Final    Albumin/Globulin Ratio 07/18/2018 1.1  1.1 - 2.2 Final    Total Bilirubin 07/18/2018 <0.2  0.0 - 1.0 mg/dL Final    Alkaline Phosphatase 07/18/2018 121  40 - 129 U/L Final    ALT 07/18/2018 438* 10 - 40 U/L Final    AST 07/18/2018 295* 15 - 37 U/L Final    Globulin 07/18/2018 3.4  g/dL Final    Ethanol Lvl 07/18/2018 171  mg/dL Final    Comment:    None Detected  Conversion factor:  100 mg/dl = .100 g/dl  For Medical Purposes Only      Ethanol [facility] 07/18/2018 50  mg/dL Final    Comment:    None Detected  Conversion factor:  100 mg/dl = .100 g/dl  For Medical Purposes Only      Ammonia 07/19/2018 44  16 - 60 umol/L Final    Amylase 07/19/2018 59  25 - 115 U/L Final    Lipase 07/19/2018 21.0  13.0 - 60.0 U/L Final    Vitamin B-12 07/19/2018 996* 211 - 911 pg/mL Final    TSH 07/19/2018 2.00  0.27 - 4.20 uIU/mL Final    RPR 07/19/2018 Non-reactive  Non-reactive Final        Mental Status Exam at Discharge:  Level of consciousness:  awake  Appearance:  well-appearing, in chair, good grooming and good hygiene well-developed, well-nourished  Behavior/Motor:  no abnormalities noted normal gait and station AIMS: 0  Attitude toward examiner:  cooperative, attentive and good eye contact  Speech:  spontaneous, normal rate, normal volume and well articulated  Mood:  dysthymic  Affect:  mood congruent Anxiety: mild  Hallucinations: Absent  Thought processes:  coherent Attention span, Concentration & Attention:  attention span and concentration were age appropriate  Thought content:  Reality based no evidence of delusions OCD: none    Insight: normal insight and judgment Cognition:  oriented to person, place, and time  Fund of Knowledge: average  IQ:average Memory: intact  Suicide:  No specific plan to harm self  Sleep: sleeps through the night  Appetite: ok   Reassess Zeny Risk:  no specific plan to harm self Pt has phone numbers to contact if suicidal thoughts recur and states pt will return to the hospital if suicidal feelings return.    Hospital Routine Meds:     nicotine  1 patch Transdermal Daily      Hospital PRN Meds: acetaminophen, magnesium hydroxide, hydrOXYzine, OLANZapine, OLANZapine, LORazepam **OR** LORazepam **OR** LORazepam **OR** LORazepam   Discharge Meds:    Discharge Medication List as of 7/19/2018  1:45 PM           Details   OLANZapine (ZYPREXA) 10 MG tablet Take 1 tablet by mouth nightly, Disp-30 tablet, R-0Print                 Disposition - Residence Home or Self Care       Follow Up:  See Discharge Instructions

## 2018-07-20 NOTE — BH NOTE
5 [facility]  Discharge Note    Pt discharged with followings belongings:   Dentures: None  Vision - Corrective Lenses: None  Hearing Aid: None  Jewelry: None  Body Piercings Removed: N/A  Clothing: Footwear, Pants, Shirt, Socks  Other Valuables: None   Valuables sent home with patient. Patient left department with Departure Mode: With parents via Mobility at Departure: Ambulatory, discharged to Discharged to: Private Residence. Patient education on aftercare instructions: follow up and medications   Patient verbalize understanding of AVS:  yes. Status EXAM upon discharge:  Status and Exam  Normal: No  Facial Expression: Avoids Gaze, Flat  Affect: Blunt  Level of Consciousness: Alert  Mood:Normal: No  Mood: Irritable, Angry  Motor Activity:Normal: No  Motor Activity: Agitated  Interview Behavior: Aggressive  Preception: [place] to Person, [place] to Time, [place] to Place, [place] to Situation  Attention:Normal: No  Attention: Unable to Concentrate  Thought Processes: Blocking  Thought Content:Normal: Yes  Thought Content: Poverty of Content  Hallucinations: None  Delusions: No  Memory:Normal: Yes  Memory: Poor Recent, Poor Remote(improving)  Insight and Judgment: No(Improving)  Insight and Judgment: Poor Judgment, Poor Insight(improving)  Present Suicidal Ideation: No  Present Homicidal Ideation: No    Bridge Appointment completed: Reviewed Discharge Instructions with patient. Patient verbalizes understanding and agreement with the discharge plan using the teachback method.      Referral for Outpatient Tobacco Cessation Counseling, upon discharge (sukhjinder X if applicable and completed):    ( )  Hospital staff assisted patient to call Quit Line or faxed referral                                   during hospitalization                  ( )  Recognizing danger situations (included triggers and roadblocks), if not completed on admission                    ( )  Coping skills (new ways to manage stress,
Follow up call completed. Writer was able to speak with the patient. Patient did not have any questions regarding their discharge.
Writer completed pt psychosocial assessment. At/OT and CSSR-S complete. Pt denies SI/HI at this time. Pt was extremely irritable. Pt did not make eye contact. Pt left assessment and slammed his room door after he entered it. Pt is not interested in tx post d/c. States \"I do not have a drinking problem. \"    Samson Nicole, CTRS
ways to manage stress, exercise, relaxation techniques, changing routine, distraction)                                                           ( )  Basic information about quitting (benefits of quitting, techniques in how to quit, available resources  ( ) Referral for counseling faxed to [facility]                                           ( ) Patient refused counseling  ( ) Patient has not smoked in the last 30 days    Metabolic Screening:    No results found for: LABA1C    No results for input(s): CHOL, TRIG, HDL, LDLCALC, LABVLDL in the last 72 hours. Body mass index is 25.68 kg/m². BP Readings from Last 2 Encounters:   07/18/18 (!) 154/89   02/14/18 132/79           Pt admitted with followings belongings:        Valuables placed in locker. Patient's home medications were none. Patient oriented to surroundings and program expectations and copy of patient rights given. Received admission packet:  yes. Consents reviewed, signed yes. Refused voluntary admission form. Patient verbalize understanding:  yes.     Patient education on precautions: yes                   Anastacio Bernal RN

## 2019-09-15 ENCOUNTER — APPOINTMENT (OUTPATIENT)
Dept: GENERAL RADIOLOGY | Age: 34
End: 2019-09-15
Payer: MEDICAID

## 2019-09-15 ENCOUNTER — HOSPITAL ENCOUNTER (EMERGENCY)
Age: 34
Discharge: HOME | End: 2019-09-15
Attending: EMERGENCY MEDICINE
Payer: MEDICAID

## 2019-09-15 VITALS
DIASTOLIC BLOOD PRESSURE: 98 MMHG | OXYGEN SATURATION: 98 % | SYSTOLIC BLOOD PRESSURE: 141 MMHG | HEART RATE: 67 BPM | TEMPERATURE: 96.7 F | BODY MASS INDEX: 22.68 KG/M2 | WEIGHT: 162 LBS | RESPIRATION RATE: 16 BRPM | HEIGHT: 71 IN

## 2019-09-15 DIAGNOSIS — G89.29 CHRONIC ABDOMINAL PAIN: ICD-10-CM

## 2019-09-15 DIAGNOSIS — R10.9 CHRONIC ABDOMINAL PAIN: ICD-10-CM

## 2019-09-15 DIAGNOSIS — S63.91XA HAND SPRAIN, RIGHT, INITIAL ENCOUNTER: Primary | ICD-10-CM

## 2019-09-15 PROCEDURE — 99283 EMERGENCY DEPT VISIT LOW MDM: CPT

## 2019-09-15 PROCEDURE — 73130 X-RAY EXAM OF HAND: CPT

## 2019-09-15 PROCEDURE — 4500000023 HC ED LEVEL 3 PROCEDURE

## 2019-09-15 ASSESSMENT — PAIN SCALES - GENERAL: PAINLEVEL_OUTOF10: 5

## 2019-09-16 NOTE — ED NOTES
Patient reports falling last night and right hand injury, no deformity, limited movement. . Denies LOC, reports falling due to drinking 'a couple of beers.' also reports abd pain x1 year. Patient also reports he does not take any home meds at this time, but does report that he has a medical marijuana card and vapes.       Yulia Rangel RN  09/15/19 2122       Yulia Rangel RN  09/15/19 2123

## 2019-09-16 NOTE — ED PROVIDER NOTES
Medical: Not on file     Non-medical: Not on file   Tobacco Use    Smoking status: Current Every Day Smoker     Packs/day: 1.00     Years: 12.00     Pack years: 12.00     Types: Cigarettes    Smokeless tobacco: Never Used    Tobacco comment: handouts given   Substance and Sexual Activity    Alcohol use: Yes     Alcohol/week: 12.0 standard drinks     Types: 12 Cans of beer per week     Comment: 1/2 case of beer per day    Drug use: No     Types: Opiates , Methamphetamines, Marijuana, IV    Sexual activity: Yes     Partners: Female   Lifestyle    Physical activity:     Days per week: Not on file     Minutes per session: Not on file    Stress: Not on file   Relationships    Social connections:     Talks on phone: Not on file     Gets together: Not on file     Attends Church service: Not on file     Active member of club or organization: Not on file     Attends meetings of clubs or organizations: Not on file     Relationship status: Not on file    Intimate partner violence:     Fear of current or ex partner: Not on file     Emotionally abused: Not on file     Physically abused: Not on file     Forced sexual activity: Not on file   Other Topics Concern    Not on file   Social History Narrative    Not on file     No current facility-administered medications for this encounter. No current outpatient medications on file. No Known Allergies    REVIEW OF SYSTEMS  CONSTITUTIONAL: Denies fever, chills, sweats  EYES: No visual disturbance. No drainage, swelling, or pain  ENT: No ear pain or drainage, nasal congestion or bleeding, sore throat or difficulty swallowing  PULMONARY: No difficulty breathing. Chronic cough for 2 years. CARDIOVASCULAR: No chest pain, syncope, palpitations or edema  GASTROINTESTINAL: Vague epigastric pain for 1 to 3 years with daily vomiting. No new or changed symptoms today. GENITOURINARY: No hematuria, dysuria, frequency or urgency.    DERMATOLOGIC: No skin lesions,

## 2019-09-23 ENCOUNTER — TELEPHONE (OUTPATIENT)
Dept: ORTHOPEDIC SURGERY | Age: 34
End: 2019-09-23

## 2019-09-23 ENCOUNTER — OFFICE VISIT (OUTPATIENT)
Dept: ORTHOPEDIC SURGERY | Age: 34
End: 2019-09-23
Payer: MEDICAID

## 2019-09-23 VITALS — WEIGHT: 162.04 LBS | BODY MASS INDEX: 22.69 KG/M2 | HEIGHT: 71 IN

## 2019-09-23 DIAGNOSIS — S63.501A WRIST SPRAIN, RIGHT, INITIAL ENCOUNTER: ICD-10-CM

## 2019-09-23 DIAGNOSIS — M79.641 PAIN OF RIGHT HAND: Primary | ICD-10-CM

## 2019-09-23 PROCEDURE — G8420 CALC BMI NORM PARAMETERS: HCPCS | Performed by: ORTHOPAEDIC SURGERY

## 2019-09-23 PROCEDURE — 4004F PT TOBACCO SCREEN RCVD TLK: CPT | Performed by: ORTHOPAEDIC SURGERY

## 2019-09-23 PROCEDURE — 99203 OFFICE O/P NEW LOW 30 MIN: CPT | Performed by: ORTHOPAEDIC SURGERY

## 2019-09-23 PROCEDURE — G8427 DOCREV CUR MEDS BY ELIG CLIN: HCPCS | Performed by: ORTHOPAEDIC SURGERY

## 2019-09-23 NOTE — PROGRESS NOTES
Non-medical: None   Tobacco Use    Smoking status: Current Every Day Smoker     Packs/day: 1.00     Years: 12.00     Pack years: 12.00     Types: Cigarettes    Smokeless tobacco: Never Used    Tobacco comment: handouts given   Substance and Sexual Activity    Alcohol use: Yes     Alcohol/week: 12.0 standard drinks     Types: 12 Cans of beer per week     Comment: 1/2 case of beer per day    Drug use: No     Types: Opiates , Methamphetamines, Marijuana, IV    Sexual activity: Yes     Partners: Female   Lifestyle    Physical activity:     Days per week: None     Minutes per session: None    Stress: None   Relationships    Social connections:     Talks on phone: None     Gets together: None     Attends Jehovah's witness service: None     Active member of club or organization: None     Attends meetings of clubs or organizations: None     Relationship status: None    Intimate partner violence:     Fear of current or ex partner: None     Emotionally abused: None     Physically abused: None     Forced sexual activity: None   Other Topics Concern    None   Social History Narrative    None     No current outpatient medications on file. No current facility-administered medications for this visit. No Known Allergies    ROS:  ROS neg except for positives in HPI    PHYSICAL EXAMINATION:    Gen/Psych: Examination reveals a pleasant individual in no acute distress. The patient is oriented to time, place and person. The patient's mood and affect are appropriate. Lymph: The lymphatic examination bilaterally reveals all areas to be without enlargement or induration. Skin intact without lymphadenopathy, discoloration, or abnormal temperature. Vascular: There is intact, symmetric circulation in both upper extremities.     Musculoskeletal       Cervical spine, shoulders, elbows, digits:    satisfactory pain-free range of motion,                                                                           strength, and stability       Right wrist:  Pain to palpation in the Radial wrist, near the [facility] joint and snuffbox. Swelling is  present. Swelling is mild and on the radial aspect of the hand extending into the radial aspect of the wrist.  Skin is intact. No ecchymosis noted. Strength and ROM limited by pain. No pain or swelling and full ROM of other digits/hand  There is not clinical evidence of skeletal deformity or mal-rotation. No instability or pain at the thumb MP joint. I do not have exam findings consistent with skiers thumb. contralateral wrist : normal ROM without pain. No pain on palpation. No swelling. Normal strength. Neurological:  Essentially baseline normal .  Fingers warm and sensate but there is  weakness    DIAGNOSTIC TESTING:     X-rays reviewed in office:  EXAMINATION:   THREE XRAY VIEWS OF THE RIGHT HAND       9/15/2019 6:35 pm       COMPARISON:   None.       HISTORY:   ORDERING SYSTEM PROVIDED HISTORY: fall/injury/pain   TECHNOLOGIST PROVIDED HISTORY:   Reason for exam:->fall/injury/pain   Reason for Exam: fell on hand   Acuity: Acute   Type of Exam: Initial       FINDINGS:   Chronic deformity of the 5th metacarpal is noted.  Chronic ossicle at the 5th   carpometacarpal joint noted as well.  No acute fractures are identified.  No   dislocations are seen.  No soft tissue abnormalities are identified.           Impression   No acute abnormality identified.             IMPRESSION AND PLAN: Right wrist pain, right wrist sprain    We discussed the natural course of wrist sprains and appropriate follow-up plans to insure interval healing and improvement. Cannot rule out nondisplaced scaphoid fracture at this point. I would recommend immobilization with a thumb spica fracture brace. Appropriate brace use and activity modifications were outlined. Follow-up in 6 weeks. Repeat x-ray right wrist including scaphoid view.         Procedures    [employer] Long Thumb Spica Fracture Brace     Patient was prescribed an [employer]os Long Thumb Spica Fracture Brace. The right hand  will require stabilization / immobilization from this semi-rigid / rigid orthosis to improve their function. The orthosis will assist in protecting the affected area, provide functional support and facilitate healing. The orthosis used a heating element to mold and shape the brace to provide a customizable fit for the patient. The patient was educated and fit by a healthcare professional with expert knowledge and specialization in brace application while under the direct supervision of the treating physician. Verbal and written instructions for the use of and application of this item were provided. They were instructed to contact the office immediately should the brace result in increased pain, decreased sensation, increased swelling or worsening of the condition. All questions and concerns were addressed today. Patient is in agreement with the plan. Rylan Farley MD  Hand & Upper Extremity Surgery  [address] [facility]  A partner of [facility] [place] ([facility])        Please note that this transcription was created using voice recognition software. Any errors are unintentional and may be due to voice recognition transcription.

## 2019-09-24 ENCOUNTER — TELEPHONE (OUTPATIENT)
Dept: ORTHOPEDIC SURGERY | Age: 34
End: 2019-09-24

## 2019-09-24 DIAGNOSIS — M25.531 RIGHT WRIST PAIN: Primary | ICD-10-CM

## 2019-09-24 PROCEDURE — L3984 UPPER EXT FX ORTHOSIS WRIST: HCPCS | Performed by: ORTHOPAEDIC SURGERY

## 2019-09-24 RX ORDER — IBUPROFEN 800 MG/1
800 TABLET ORAL EVERY 12 HOURS
Qty: 30 TABLET | Refills: 0 | Status: SHIPPED | OUTPATIENT
Start: 2019-09-24 | End: 2019-11-12

## 2019-10-02 ENCOUNTER — APPOINTMENT (OUTPATIENT)
Dept: CT IMAGING | Age: 34
End: 2019-10-02
Payer: MEDICAID

## 2019-10-02 ENCOUNTER — HOSPITAL ENCOUNTER (EMERGENCY)
Age: 34
Discharge: HOME | End: 2019-10-02
Payer: MEDICAID

## 2019-10-02 VITALS
RESPIRATION RATE: 16 BRPM | DIASTOLIC BLOOD PRESSURE: 85 MMHG | WEIGHT: 166 LBS | BODY MASS INDEX: 23.24 KG/M2 | OXYGEN SATURATION: 96 % | HEART RATE: 16 BPM | TEMPERATURE: 98.6 F | HEIGHT: 71 IN | SYSTOLIC BLOOD PRESSURE: 132 MMHG

## 2019-10-02 DIAGNOSIS — S39.012A STRAIN OF LUMBAR REGION, INITIAL ENCOUNTER: Primary | ICD-10-CM

## 2019-10-02 DIAGNOSIS — M51.37 DEGENERATIVE DISC DISEASE AT L5-S1 LEVEL: ICD-10-CM

## 2019-10-02 PROCEDURE — 6360000002 HC RX W HCPCS: Performed by: PHYSICIAN ASSISTANT

## 2019-10-02 PROCEDURE — 99283 EMERGENCY DEPT VISIT LOW MDM: CPT

## 2019-10-02 PROCEDURE — 72131 CT LUMBAR SPINE W/O DYE: CPT

## 2019-10-02 PROCEDURE — 96372 THER/PROPH/DIAG INJ SC/IM: CPT

## 2019-10-02 PROCEDURE — 6370000000 HC RX 637 (ALT 250 FOR IP): Performed by: PHYSICIAN ASSISTANT

## 2019-10-02 RX ORDER — LIDOCAINE 4 G/G
1 PATCH TOPICAL ONCE
Status: DISCONTINUED | OUTPATIENT
Start: 2019-10-02 | End: 2019-10-02 | Stop reason: HOSPADM

## 2019-10-02 RX ORDER — LIDOCAINE 50 MG/G
1 PATCH TOPICAL DAILY
Qty: 10 PATCH | Refills: 0 | Status: SHIPPED | OUTPATIENT
Start: 2019-10-02 | End: 2019-10-12

## 2019-10-02 RX ORDER — KETOROLAC TROMETHAMINE 30 MG/ML
15 INJECTION, SOLUTION INTRAMUSCULAR; INTRAVENOUS ONCE
Status: COMPLETED | OUTPATIENT
Start: 2019-10-02 | End: 2019-10-02

## 2019-10-02 RX ORDER — PREDNISONE 10 MG/1
TABLET ORAL
Qty: 30 TABLET | Refills: 0 | Status: SHIPPED | OUTPATIENT
Start: 2019-10-02 | End: 2019-10-12

## 2019-10-02 RX ADMIN — KETOROLAC TROMETHAMINE 15 MG: 30 INJECTION, SOLUTION INTRAMUSCULAR at 16:55

## 2019-10-02 ASSESSMENT — PAIN DESCRIPTION - DESCRIPTORS: DESCRIPTORS: CONSTANT

## 2019-10-02 ASSESSMENT — PAIN DESCRIPTION - ORIENTATION: ORIENTATION: LOWER

## 2019-10-02 ASSESSMENT — PAIN DESCRIPTION - LOCATION: LOCATION: BACK

## 2019-10-02 ASSESSMENT — PAIN SCALES - GENERAL: PAINLEVEL_OUTOF10: 6

## 2019-10-02 ASSESSMENT — PAIN DESCRIPTION - PAIN TYPE: TYPE: ACUTE PAIN

## 2019-10-04 ENCOUNTER — OFFICE VISIT (OUTPATIENT)
Dept: ORTHOPEDIC SURGERY | Age: 34
End: 2019-10-04
Payer: MEDICAID

## 2019-10-04 VITALS
DIASTOLIC BLOOD PRESSURE: 104 MMHG | SYSTOLIC BLOOD PRESSURE: 173 MMHG | BODY MASS INDEX: 23.24 KG/M2 | HEART RATE: 96 BPM | WEIGHT: 166.01 LBS | HEIGHT: 71 IN

## 2019-10-04 DIAGNOSIS — M54.16 LUMBAR RADICULOPATHY: ICD-10-CM

## 2019-10-04 DIAGNOSIS — M47.816 SPONDYLOSIS WITHOUT MYELOPATHY OR RADICULOPATHY, LUMBAR REGION: Primary | ICD-10-CM

## 2019-10-04 DIAGNOSIS — M51.26 HNP (HERNIATED NUCLEUS PULPOSUS), LUMBAR: ICD-10-CM

## 2019-10-04 PROCEDURE — G8427 DOCREV CUR MEDS BY ELIG CLIN: HCPCS | Performed by: PHYSICIAN ASSISTANT

## 2019-10-04 PROCEDURE — G8420 CALC BMI NORM PARAMETERS: HCPCS | Performed by: PHYSICIAN ASSISTANT

## 2019-10-04 PROCEDURE — G8484 FLU IMMUNIZE NO ADMIN: HCPCS | Performed by: PHYSICIAN ASSISTANT

## 2019-10-04 PROCEDURE — 99244 OFF/OP CNSLTJ NEW/EST MOD 40: CPT | Performed by: PHYSICIAN ASSISTANT

## 2019-10-04 RX ORDER — METHOCARBAMOL 500 MG/1
500 TABLET, FILM COATED ORAL 2 TIMES DAILY
Qty: 60 TABLET | Refills: 0 | Status: SHIPPED | OUTPATIENT
Start: 2019-10-04 | End: 2019-11-03

## 2019-10-07 ENCOUNTER — TELEPHONE (OUTPATIENT)
Dept: ORTHOPEDIC SURGERY | Age: 34
End: 2019-10-07

## 2019-10-07 RX ORDER — CYCLOBENZAPRINE HCL 10 MG
10 TABLET ORAL NIGHTLY
Qty: 30 TABLET | Refills: 0 | Status: SHIPPED | OUTPATIENT
Start: 2019-10-07 | End: 2019-10-10 | Stop reason: SDUPTHER

## 2019-10-09 ENCOUNTER — TELEPHONE (OUTPATIENT)
Dept: ORTHOPEDIC SURGERY | Age: 34
End: 2019-10-09

## 2019-10-09 DIAGNOSIS — M54.16 LUMBAR RADICULOPATHY: ICD-10-CM

## 2019-10-09 DIAGNOSIS — M47.816 SPONDYLOSIS WITHOUT MYELOPATHY OR RADICULOPATHY, LUMBAR REGION: Primary | ICD-10-CM

## 2019-10-09 DIAGNOSIS — M51.26 HNP (HERNIATED NUCLEUS PULPOSUS), LUMBAR: ICD-10-CM

## 2019-10-10 ENCOUNTER — TELEPHONE (OUTPATIENT)
Dept: ORTHOPEDIC SURGERY | Age: 34
End: 2019-10-10

## 2019-10-10 RX ORDER — CYCLOBENZAPRINE HCL 10 MG
10 TABLET ORAL NIGHTLY
Qty: 30 TABLET | Refills: 0 | Status: SHIPPED | OUTPATIENT
Start: 2019-10-10 | End: 2019-10-15

## 2019-10-15 ENCOUNTER — HOSPITAL ENCOUNTER (EMERGENCY)
Age: 34
Discharge: LEFT BEFORE DISCHARGE | End: 2019-10-15
Payer: MEDICAID

## 2019-10-15 ENCOUNTER — OFFICE VISIT (OUTPATIENT)
Dept: ORTHOPEDIC SURGERY | Age: 34
End: 2019-10-15
Payer: MEDICAID

## 2019-10-15 VITALS
HEART RATE: 92 BPM | DIASTOLIC BLOOD PRESSURE: 87 MMHG | SYSTOLIC BLOOD PRESSURE: 119 MMHG | WEIGHT: 166.01 LBS | HEIGHT: 71 IN | BODY MASS INDEX: 23.24 KG/M2

## 2019-10-15 VITALS
OXYGEN SATURATION: 97 % | HEIGHT: 71 IN | DIASTOLIC BLOOD PRESSURE: 92 MMHG | SYSTOLIC BLOOD PRESSURE: 152 MMHG | BODY MASS INDEX: 22.68 KG/M2 | RESPIRATION RATE: 16 BRPM | HEART RATE: 97 BPM | TEMPERATURE: 98.1 F | WEIGHT: 162 LBS

## 2019-10-15 DIAGNOSIS — M51.26 HNP (HERNIATED NUCLEUS PULPOSUS), LUMBAR: Primary | ICD-10-CM

## 2019-10-15 DIAGNOSIS — M54.16 LUMBAR RADICULOPATHY: ICD-10-CM

## 2019-10-15 DIAGNOSIS — Z53.21 ELOPED FROM EMERGENCY DEPARTMENT: Primary | ICD-10-CM

## 2019-10-15 DIAGNOSIS — M54.16 CHRONIC LUMBAR RADICULOPATHY: ICD-10-CM

## 2019-10-15 DIAGNOSIS — M51.36 DDD (DEGENERATIVE DISC DISEASE), LUMBAR: ICD-10-CM

## 2019-10-15 PROCEDURE — 99282 EMERGENCY DEPT VISIT SF MDM: CPT

## 2019-10-15 PROCEDURE — 99214 OFFICE O/P EST MOD 30 MIN: CPT | Performed by: PHYSICAL MEDICINE & REHABILITATION

## 2019-10-15 PROCEDURE — 4004F PT TOBACCO SCREEN RCVD TLK: CPT | Performed by: PHYSICAL MEDICINE & REHABILITATION

## 2019-10-15 PROCEDURE — G8484 FLU IMMUNIZE NO ADMIN: HCPCS | Performed by: PHYSICAL MEDICINE & REHABILITATION

## 2019-10-15 PROCEDURE — G8427 DOCREV CUR MEDS BY ELIG CLIN: HCPCS | Performed by: PHYSICAL MEDICINE & REHABILITATION

## 2019-10-15 PROCEDURE — G8420 CALC BMI NORM PARAMETERS: HCPCS | Performed by: PHYSICAL MEDICINE & REHABILITATION

## 2019-10-15 ASSESSMENT — PAIN DESCRIPTION - LOCATION: LOCATION: BACK

## 2019-10-15 ASSESSMENT — PAIN DESCRIPTION - DESCRIPTORS: DESCRIPTORS: SHARP

## 2019-10-15 ASSESSMENT — PAIN SCALES - GENERAL: PAINLEVEL_OUTOF10: 6

## 2019-10-15 ASSESSMENT — PAIN DESCRIPTION - PAIN TYPE: TYPE: ACUTE PAIN

## 2019-10-23 ENCOUNTER — TELEPHONE (OUTPATIENT)
Dept: PAIN MANAGEMENT | Age: 34
End: 2019-10-23

## 2019-10-29 ENCOUNTER — TELEPHONE (OUTPATIENT)
Dept: ORTHOPEDIC SURGERY | Age: 34
End: 2019-10-29

## 2019-11-12 ENCOUNTER — APPOINTMENT (OUTPATIENT)
Dept: GENERAL RADIOLOGY | Age: 34
End: 2019-11-12
Payer: COMMERCIAL

## 2019-11-12 ENCOUNTER — HOSPITAL ENCOUNTER (EMERGENCY)
Age: 34
Discharge: HOME | End: 2019-11-12
Payer: COMMERCIAL

## 2019-11-12 VITALS
HEIGHT: 71 IN | RESPIRATION RATE: 12 BRPM | SYSTOLIC BLOOD PRESSURE: 132 MMHG | WEIGHT: 162 LBS | TEMPERATURE: 98.1 F | OXYGEN SATURATION: 98 % | BODY MASS INDEX: 22.68 KG/M2 | DIASTOLIC BLOOD PRESSURE: 78 MMHG | HEART RATE: 78 BPM

## 2019-11-12 DIAGNOSIS — R07.81 RIB PAIN ON RIGHT SIDE: ICD-10-CM

## 2019-11-12 DIAGNOSIS — S70.01XA CONTUSION OF RIGHT HIP, INITIAL ENCOUNTER: ICD-10-CM
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
## 2019-11-12 PROCEDURE — 73502 X-RAY EXAM HIP UNI 2-3 VIEWS: CPT

## 2019-11-12 PROCEDURE — 71101 X-RAY EXAM UNILAT RIBS/CHEST: CPT

## 2019-11-12 PROCEDURE — 99284 EMERGENCY DEPT VISIT MOD MDM: CPT

## 2019-11-12 RX ORDER — NAPROXEN 500 MG/1
500 TABLET ORAL 2 TIMES DAILY WITH MEALS
Qty: 14 TABLET | Refills: 0 | Status: SHIPPED | OUTPATIENT
Start: 2019-11-12 | End: 2019-12-23 | Stop reason: ALTCHOICE

## 2019-11-12 ASSESSMENT — PAIN DESCRIPTION - ORIENTATION: ORIENTATION: RIGHT

## 2019-11-12 ASSESSMENT — PAIN SCALES - GENERAL: PAINLEVEL_OUTOF10: 5

## 2019-11-12 ASSESSMENT — PAIN DESCRIPTION - LOCATION: LOCATION: RIB CAGE

## 2019-11-15 ENCOUNTER — TELEPHONE (OUTPATIENT)
Dept: PAIN MANAGEMENT | Age: 34
End: 2019-11-15

## 2019-11-26 ENCOUNTER — HOSPITAL ENCOUNTER (EMERGENCY)
Age: 34
Discharge: LEFT BEFORE DISCHARGE | End: 2019-11-26
Payer: MEDICAID

## 2019-11-26 VITALS
BODY MASS INDEX: 22.68 KG/M2 | HEIGHT: 71 IN | SYSTOLIC BLOOD PRESSURE: 159 MMHG | TEMPERATURE: 98.1 F | WEIGHT: 162 LBS | DIASTOLIC BLOOD PRESSURE: 95 MMHG | HEART RATE: 75 BPM

## 2019-11-26 DIAGNOSIS — Z53.21 PATIENT LEFT WITHOUT BEING SEEN: Primary | ICD-10-CM

## 2019-11-26 PROCEDURE — 4500000002 HC ER NO CHARGE

## 2019-11-26 ASSESSMENT — PAIN SCALES - GENERAL: PAINLEVEL_OUTOF10: 5

## 2019-12-23 ENCOUNTER — HOSPITAL ENCOUNTER (EMERGENCY)
Age: 34
Discharge: HOME | End: 2019-12-23
Attending: EMERGENCY MEDICINE
Payer: MEDICAID

## 2019-12-23 ENCOUNTER — APPOINTMENT (OUTPATIENT)
Dept: GENERAL RADIOLOGY | Age: 34
End: 2019-12-23
Payer: MEDICAID

## 2019-12-23 VITALS
SYSTOLIC BLOOD PRESSURE: 129 MMHG | RESPIRATION RATE: 19 BRPM | HEART RATE: 95 BPM | TEMPERATURE: 98.4 F | OXYGEN SATURATION: 95 % | HEIGHT: 70 IN | WEIGHT: 162 LBS | DIASTOLIC BLOOD PRESSURE: 72 MMHG | BODY MASS INDEX: 23.19 KG/M2

## 2019-12-23 DIAGNOSIS — R07.9 CHEST PAIN, UNSPECIFIED TYPE: Primary | ICD-10-CM

## 2019-12-23 LAB
A/G RATIO: 1.4 (ref 1.1–2.2)
ALBUMIN SERPL-MCNC: 4.3 G/DL (ref 3.4–5)
ALP BLD-CCNC: 97 U/L (ref 40–129)
ALT SERPL-CCNC: 97 U/L (ref 10–40)
ANION GAP SERPL CALCULATED.3IONS-SCNC: 11 MMOL/L (ref 3–16)
ANION GAP SERPL CALCULATED.3IONS-SCNC: 33 MMOL/L (ref 3–16)
AST SERPL-CCNC: 81 U/L (ref 15–37)
BASE EXCESS VENOUS: -1 MMOL/L (ref -3–3)
BASOPHILS ABSOLUTE: 0.1 K/UL (ref 0–0.2)
BASOPHILS RELATIVE PERCENT: 0.7 %
BILIRUB SERPL-MCNC: 0.8 MG/DL (ref 0–1)
BUN BLDV-MCNC: 12 MG/DL (ref 7–20)
BUN BLDV-MCNC: 12 MG/DL (ref 7–20)
CALCIUM SERPL-MCNC: 7.9 MG/DL (ref 8.3–10.6)
CALCIUM SERPL-MCNC: 9.4 MG/DL (ref 8.3–10.6)
CARBOXYHEMOGLOBIN: 5.5 % (ref 0–1.5)
CHLORIDE BLD-SCNC: 104 MMOL/L (ref 99–110)
CHLORIDE BLD-SCNC: 99 MMOL/L (ref 99–110)
CO2: 22 MMOL/L (ref 21–32)
CO2: 25 MMOL/L (ref 21–32)
CREAT SERPL-MCNC: 0.8 MG/DL (ref 0.9–1.3)
CREAT SERPL-MCNC: 1 MG/DL (ref 0.9–1.3)
EOSINOPHILS ABSOLUTE: 0 K/UL (ref 0–0.6)
EOSINOPHILS RELATIVE PERCENT: 0.2 %
GFR AFRICAN AMERICAN: >60
GFR AFRICAN AMERICAN: >60
GFR NON-AFRICAN AMERICAN: >60
GFR NON-AFRICAN AMERICAN: >60
GLOBULIN: 3.1 G/DL
GLUCOSE BLD-MCNC: 109 MG/DL (ref 70–99)
GLUCOSE BLD-MCNC: 91 MG/DL (ref 70–99)
HCO3 VENOUS: 24.1 MMOL/L (ref 23–29)
HCT VFR BLD CALC: 44.7 % (ref 40.5–52.5)
HEMOGLOBIN: 15.6 G/DL (ref 13.5–17.5)
LYMPHOCYTES ABSOLUTE: 2 K/UL (ref 1–5.1)
LYMPHOCYTES RELATIVE PERCENT: 19.3 %
MCH RBC QN AUTO: 33.6 PG (ref 26–34)
MCHC RBC AUTO-ENTMCNC: 34.9 G/DL (ref 31–36)
MCV RBC AUTO: 96.2 FL (ref 80–100)
METHEMOGLOBIN VENOUS: 0.5 %
MONOCYTES ABSOLUTE: 1 K/UL (ref 0–1.3)
MONOCYTES RELATIVE PERCENT: 9.1 %
NEUTROPHILS ABSOLUTE: 7.5 K/UL (ref 1.7–7.7)
NEUTROPHILS RELATIVE PERCENT: 70.7 %
O2 CONTENT, VEN: 20 VOL %
O2 SAT, VEN: 92 %
O2 THERAPY: ABNORMAL
PCO2, VEN: 41.6 MMHG (ref 40–50)
PDW BLD-RTO: 12.5 % (ref 12.4–15.4)
PH VENOUS: 7.38 (ref 7.35–7.45)
PLATELET # BLD: 277 K/UL (ref 135–450)
PMV BLD AUTO: 7.9 FL (ref 5–10.5)
PO2, VEN: 60.9 MMHG (ref 25–40)
POTASSIUM SERPL-SCNC: 4.1 MMOL/L (ref 3.5–5.1)
POTASSIUM SERPL-SCNC: 4.3 MMOL/L (ref 3.5–5.1)
RBC # BLD: 4.64 M/UL (ref 4.2–5.9)
SODIUM BLD-SCNC: 137 MMOL/L (ref 136–145)
SODIUM BLD-SCNC: 157 MMOL/L (ref 136–145)
TCO2 CALC VENOUS: 25 MMOL/L
TOTAL PROTEIN: 7.4 G/DL (ref 6.4–8.2)
TROPONIN: <0.01 NG/ML
TROPONIN: <0.01 NG/ML
WBC # BLD: 10.6 K/UL (ref 4–11)

## 2019-12-23 PROCEDURE — 71046 X-RAY EXAM CHEST 2 VIEWS: CPT

## 2019-12-23 PROCEDURE — 93005 ELECTROCARDIOGRAM TRACING: CPT | Performed by: EMERGENCY MEDICINE

## 2019-12-23 PROCEDURE — 85025 COMPLETE CBC W/AUTO DIFF WBC: CPT

## 2019-12-23 PROCEDURE — 84484 ASSAY OF TROPONIN QUANT: CPT

## 2019-12-23 PROCEDURE — 80053 COMPREHEN METABOLIC PANEL: CPT

## 2019-12-23 PROCEDURE — 82803 BLOOD GASES ANY COMBINATION: CPT

## 2019-12-23 PROCEDURE — 99285 EMERGENCY DEPT VISIT HI MDM: CPT

## 2019-12-23 PROCEDURE — 2580000003 HC RX 258: Performed by: PHYSICIAN ASSISTANT

## 2019-12-23 RX ORDER — 0.9 % SODIUM CHLORIDE 0.9 %
1000 INTRAVENOUS SOLUTION INTRAVENOUS ONCE
Status: COMPLETED | OUTPATIENT
Start: 2019-12-23 | End: 2019-12-23

## 2019-12-23 RX ORDER — LISINOPRIL 20 MG/1
TABLET ORAL
COMMUNITY
Start: 2019-12-04

## 2019-12-23 RX ADMIN — SODIUM CHLORIDE 1000 ML: 9 INJECTION, SOLUTION INTRAVENOUS at 17:42

## 2019-12-23 ASSESSMENT — PAIN DESCRIPTION - PAIN TYPE: TYPE: ACUTE PAIN

## 2019-12-23 ASSESSMENT — PAIN DESCRIPTION - LOCATION
LOCATION: CHEST
LOCATION: FOOT

## 2019-12-23 ASSESSMENT — PAIN SCALES - GENERAL
PAINLEVEL_OUTOF10: 5
PAINLEVEL_OUTOF10: 0

## 2019-12-23 ASSESSMENT — PAIN DESCRIPTION - ORIENTATION: ORIENTATION: RIGHT

## 2019-12-24 LAB
EKG ATRIAL RATE: 76 BPM
EKG DIAGNOSIS: NORMAL
EKG P AXIS: 40 DEGREES
EKG P-R INTERVAL: 120 MS
EKG Q-T INTERVAL: 338 MS
EKG QRS DURATION: 90 MS
EKG QTC CALCULATION (BAZETT): 380 MS
EKG R AXIS: 23 DEGREES
EKG T AXIS: 32 DEGREES
EKG VENTRICULAR RATE: 76 BPM

## 2019-12-24 PROCEDURE — 93010 ELECTROCARDIOGRAM REPORT: CPT | Performed by: INTERNAL MEDICINE

## 2020-01-29 ENCOUNTER — TELEPHONE (OUTPATIENT)
Dept: ORTHOPEDIC SURGERY | Age: 35
End: 2020-01-29

## 2020-01-29 ENCOUNTER — OFFICE VISIT (OUTPATIENT)
Dept: ORTHOPEDIC SURGERY | Age: 35
End: 2020-01-29
Payer: MEDICAID

## 2020-01-29 VITALS — BODY MASS INDEX: 23.83 KG/M2 | WEIGHT: 166.45 LBS | HEIGHT: 70 IN

## 2020-01-29 PROCEDURE — 4004F PT TOBACCO SCREEN RCVD TLK: CPT | Performed by: PODIATRIST

## 2020-01-29 PROCEDURE — G8427 DOCREV CUR MEDS BY ELIG CLIN: HCPCS | Performed by: PODIATRIST

## 2020-01-29 PROCEDURE — 99203 OFFICE O/P NEW LOW 30 MIN: CPT | Performed by: PODIATRIST

## 2020-01-29 PROCEDURE — G8420 CALC BMI NORM PARAMETERS: HCPCS | Performed by: PODIATRIST

## 2020-01-29 PROCEDURE — L4360 PNEUMAT WALKING BOOT PRE CST: HCPCS | Performed by: PODIATRIST

## 2020-01-29 PROCEDURE — G8484 FLU IMMUNIZE NO ADMIN: HCPCS | Performed by: PODIATRIST

## 2020-01-29 NOTE — PROGRESS NOTES
HISTORY OF PRESENT ILLNESS: This is an initial visit for a 79-year-old male with a chief complaint of pain to the side of the right foot. Approximately 6 weeks ago he states that he stepped on an electrical cord at the bottom of a flight of stairs at home and twisted his right foot. This is essentially benign treated because there were apparent delays in getting into see somebody. He was actually scheduled for surgery at one point but because of the administrative delays he was not able to have surgery. There is pain with pressure to the side of the foot and with weightbearing. This is relieved mainly with getting off of the foot. FAMILY HISTORY:  Documented in chart. SOCIAL HISTORY: Documented in chart. REVIEW OF SYSTEMS: Documented in chart. Family History, Social History, and Review of Systems were reviewed from patient history form dated on 1/29/2020 and available in the patient's chart under the MEDIA tab. PHYSICAL EXAM:  There is mild edema to the right foot with no ecchymosis. No open lesions or fracture blisters are noted. The shaft region of the right fifth metatarsal is painful to palpation. He has palpable pedal pulses bilateral.  His sensation is grossly intact bilateral.  The remainder of the exam is unremarkable. X-RAYS:  Three nonweightbearing x-ray views of the right foot were reviewed. A healing oblique midshaft fracture of the right fifth metatarsal is noted. This is displaced dorsally and slightly shortened. Bone callus formation noted. ASSESSMENT:  Fifth metatarsal fracture, right foot    PLAN:  I educated the patient on the pathology and its treatment options. The x-rays were reviewed with the patient. Unfortunately, he is past the window in which open reduction internal fixation would be beneficial in my opinion. A high-tide walker was applied to the right lower extremity. Weightbearing will be as tolerated.       Rest, ice, and elevation will be used to reduce pain. An ACE wrap can be used if swelling is an issue. Delayed union and non-union of the fracture was discussed. Overall activity is to be decreased to control pain. The patient was instructed not to drive with the boot on. Driving privelages are allowed as long as the boot is removed prior to driving and replaced immediately after driving is finished. A return visit will be in three weeks for follow-up x-rays. Procedures    Gloria / Clarice Arreola Tall Walking Boot     Patient was prescribed a Tall Walking Boot. The right FOOT will require stabilization / immobilization from this semi-rigid / rigid orthosis to improve their function. The orthosis will assist in protecting the affected area, provide functional support and facilitate healing. Patient was instructed to progress ambulation weight bearing as tolerated in the device. The patient was educated and fit by a healthcare professional with expert knowledge and specialization in brace application while under the direct supervision of the physician. Verbal and written instructions for the use of and application of this item were provided. They were instructed to contact the office immediately should the brace result in increased pain, decreased sensation, increased swelling or worsening of the condition.

## 2021-03-26 ENCOUNTER — HOSPITAL ENCOUNTER (EMERGENCY)
Age: 36
Discharge: HOME | End: 2021-03-26
Attending: EMERGENCY MEDICINE
Payer: MEDICAID

## 2021-03-26 ENCOUNTER — APPOINTMENT (OUTPATIENT)
Dept: GENERAL RADIOLOGY | Age: 36
End: 2021-03-26
Payer: MEDICAID

## 2021-03-26 VITALS
HEIGHT: 71 IN | HEART RATE: 88 BPM | TEMPERATURE: 98.5 F | BODY MASS INDEX: 25.2 KG/M2 | DIASTOLIC BLOOD PRESSURE: 73 MMHG | WEIGHT: 180 LBS | OXYGEN SATURATION: 100 % | SYSTOLIC BLOOD PRESSURE: 132 MMHG | RESPIRATION RATE: 18 BRPM

## 2021-03-26 DIAGNOSIS — R07.9 CHEST PAIN, UNSPECIFIED TYPE: Primary | ICD-10-CM

## 2021-03-26 LAB
A/G RATIO: 1.2 (ref 1.1–2.2)
ALBUMIN SERPL-MCNC: 4.4 G/DL (ref 3.4–5)
ALP BLD-CCNC: 113 U/L (ref 40–129)
ALT SERPL-CCNC: 228 U/L (ref 10–40)
AMPHETAMINE SCREEN, URINE: NORMAL
ANION GAP SERPL CALCULATED.3IONS-SCNC: 12 MMOL/L (ref 3–16)
AST SERPL-CCNC: 114 U/L (ref 15–37)
BARBITURATE SCREEN URINE: NORMAL
BASOPHILS ABSOLUTE: 0.1 K/UL (ref 0–0.2)
BASOPHILS RELATIVE PERCENT: 0.6 %
BENZODIAZEPINE SCREEN, URINE: NORMAL
BILIRUB SERPL-MCNC: 0.4 MG/DL (ref 0–1)
BILIRUBIN URINE: NEGATIVE
BLOOD, URINE: NEGATIVE
BUN BLDV-MCNC: 18 MG/DL (ref 7–20)
CALCIUM SERPL-MCNC: 9.6 MG/DL (ref 8.3–10.6)
CANNABINOID SCREEN URINE: NORMAL
CHLORIDE BLD-SCNC: 99 MMOL/L (ref 99–110)
CLARITY: CLEAR
CO2: 25 MMOL/L (ref 21–32)
COCAINE METABOLITE SCREEN URINE: NORMAL
COLOR: YELLOW
CREAT SERPL-MCNC: 1.3 MG/DL (ref 0.9–1.3)
EKG ATRIAL RATE: 107 BPM
EKG DIAGNOSIS: NORMAL
EKG P AXIS: 70 DEGREES
EKG P-R INTERVAL: 116 MS
EKG Q-T INTERVAL: 324 MS
EKG QRS DURATION: 84 MS
EKG QTC CALCULATION (BAZETT): 432 MS
EKG R AXIS: 58 DEGREES
EKG T AXIS: 52 DEGREES
EKG VENTRICULAR RATE: 107 BPM
EOSINOPHILS ABSOLUTE: 0 K/UL (ref 0–0.6)
EOSINOPHILS RELATIVE PERCENT: 0.2 %
GFR AFRICAN AMERICAN: >60
GFR NON-AFRICAN AMERICAN: >60
GLOBULIN: 3.6 G/DL
GLUCOSE BLD-MCNC: 94 MG/DL (ref 70–99)
GLUCOSE URINE: NEGATIVE MG/DL
HCT VFR BLD CALC: 43.5 % (ref 40.5–52.5)
HEMOGLOBIN: 14.9 G/DL (ref 13.5–17.5)
KETONES, URINE: NEGATIVE MG/DL
LEUKOCYTE ESTERASE, URINE: NEGATIVE
LYMPHOCYTES ABSOLUTE: 2.5 K/UL (ref 1–5.1)
LYMPHOCYTES RELATIVE PERCENT: 19.4 %
Lab: NORMAL
MCH RBC QN AUTO: 32.7 PG (ref 26–34)
MCHC RBC AUTO-ENTMCNC: 34.3 G/DL (ref 31–36)
MCV RBC AUTO: 95.2 FL (ref 80–100)
METHADONE SCREEN, URINE: NORMAL
MICROSCOPIC EXAMINATION: NORMAL
MONOCYTES ABSOLUTE: 0.9 K/UL (ref 0–1.3)
MONOCYTES RELATIVE PERCENT: 6.9 %
NEUTROPHILS ABSOLUTE: 9.4 K/UL (ref 1.7–7.7)
NEUTROPHILS RELATIVE PERCENT: 72.9 %
NITRITE, URINE: NEGATIVE
OPIATE SCREEN URINE: NORMAL
OXYCODONE URINE: NORMAL
PDW BLD-RTO: 13.1 % (ref 12.4–15.4)
PH UA: 5.5
PH UA: 5.5 (ref 5–8)
PHENCYCLIDINE SCREEN URINE: NORMAL
PLATELET # BLD: 244 K/UL (ref 135–450)
PMV BLD AUTO: 7.9 FL (ref 5–10.5)
POTASSIUM REFLEX MAGNESIUM: 4.2 MMOL/L (ref 3.5–5.1)
PROPOXYPHENE SCREEN: NORMAL
PROTEIN UA: NEGATIVE MG/DL
RBC # BLD: 4.57 M/UL (ref 4.2–5.9)
SODIUM BLD-SCNC: 136 MMOL/L (ref 136–145)
SPECIFIC GRAVITY UA: 1.01 (ref 1–1.03)
TOTAL PROTEIN: 8 G/DL (ref 6.4–8.2)
TROPONIN: <0.01 NG/ML
URINE REFLEX TO CULTURE: NORMAL
URINE TYPE: NORMAL
UROBILINOGEN, URINE: 0.2 E.U./DL
WBC # BLD: 12.9 K/UL (ref 4–11)

## 2021-03-26 PROCEDURE — 71045 X-RAY EXAM CHEST 1 VIEW: CPT

## 2021-03-26 PROCEDURE — 99285 EMERGENCY DEPT VISIT HI MDM: CPT

## 2021-03-26 PROCEDURE — 85025 COMPLETE CBC W/AUTO DIFF WBC: CPT

## 2021-03-26 PROCEDURE — 80053 COMPREHEN METABOLIC PANEL: CPT

## 2021-03-26 PROCEDURE — 93010 ELECTROCARDIOGRAM REPORT: CPT | Performed by: INTERNAL MEDICINE

## 2021-03-26 PROCEDURE — 84484 ASSAY OF TROPONIN QUANT: CPT

## 2021-03-26 PROCEDURE — 93005 ELECTROCARDIOGRAM TRACING: CPT | Performed by: EMERGENCY MEDICINE

## 2021-03-26 PROCEDURE — 80307 DRUG TEST PRSMV CHEM ANLYZR: CPT

## 2021-03-26 PROCEDURE — 81003 URINALYSIS AUTO W/O SCOPE: CPT

## 2021-03-26 RX ORDER — QUETIAPINE FUMARATE 100 MG/1
200 TABLET, FILM COATED ORAL EVERY EVENING
COMMUNITY

## 2021-03-26 RX ORDER — QUETIAPINE FUMARATE 25 MG/1
25 TABLET, FILM COATED ORAL 2 TIMES DAILY
COMMUNITY

## 2021-03-26 NOTE — ED PROVIDER NOTES
MG/24HR Historical Med      lisinopril (PRINIVIL;ZESTRIL) 20 MG tablet Historical Med      Blood Pressure Monitoring ([employer] BLOOD PRESSURE MONITOR) KIT Starting Thu 12/19/2019, Historical Med      diclofenac (VOLTAREN) 50 MG EC tablet Historical Med      nicotine polacrilex (NICORETTE) 4 MG gum Historical Med       !! - Potential duplicate medications found. Please discuss with provider. ALLERGIES:    Patient has no known allergies. FAMILY HISTORY:       Family History   Problem Relation Age of Onset    Depression Father     Mental Illness Father     Cancer Father     Cancer Mother           SOCIAL HISTORY:     Social History     Socioeconomic History    Marital status: Single     Spouse name: None    Number of children: None    Years of education: None    Highest education level: None   Occupational History    Occupation: unemployed   Social Needs    Financial resource strain: None    Food insecurity     Worry: None     Inability: None    Transportation needs     Medical: None     Non-medical: None   Tobacco Use    Smoking status: Current Every Day Smoker     Packs/day: 1.00     Years: 12.00     Pack years: 12.00     Types: Cigarettes    Smokeless tobacco: Never Used    Tobacco comment: handouts given   Substance and Sexual Activity    Alcohol use:  Yes     Alcohol/week: 12.0 standard drinks     Types: 12 Cans of beer per week     Comment: occ    Drug use: Yes     Types: Marijuana    Sexual activity: Yes     Partners: Female   Lifestyle    Physical activity     Days per week: None     Minutes per session: None    Stress: None   Relationships    Social connections     Talks on phone: None     Gets together: None     Attends [employer] service: None     Active member of club or organization: None     Attends meetings of clubs or organizations: None     Relationship status: None    Intimate partner violence     Fear of current or ex partner: None     Emotionally abused: None Physically abused: None     Forced sexual activity: None   Other Topics Concern    None   Social History Narrative    None       SCREENINGS:             PHYSICAL EXAM:       ED Triage Vitals [03/26/21 1440]   BP Temp Temp Source Pulse Resp SpO2 Height Weight   (!) 148/95 98.1 °F (36.7 °C) Oral 107 20 99 % 5' 11\" (1.803 m) 180 lb (81.6 kg)       Physical Exam    CONSTITUTIONAL: Awake and alert. Cooperative. Well-developed. Well-nourished. Vitals:    03/26/21 1440 03/26/21 1659 03/26/21 1824 03/26/21 1907   BP: (!) 148/95 129/71 136/73 132/73   Pulse: 107 73 76 88   Resp: 20 15 22 18   Temp: 98.1 °F (36.7 °C) 98.5 °F (36.9 °C)     TempSrc: Oral      SpO2: 99% 100% 98% 100%   Weight: 180 lb (81.6 kg)      Height: 5' 11\" (1.803 m)        HENT: Normocephalic. Atraumatic. External ears normal, without discharge. TMs clear bilaterally. Nonasal discharge. Oropharynx clear, no erythema. Mucous membranes moist.  EYES: Conjunctiva non-injected, nolid abnormalities noted. No scleral icterus. PERRL. EOM's grossly intact. Anterior chambers clear. NECK: Supple. Normal ROM. No meningismus. No thyroid tenderness or swelling noted. CARDIOVASCULAR: RRR. No Murmer. No carotid bruits. PULMONARY/CHEST WALL: Effort normal. No tachypnea. Lungs clear to ausculation. ABDOMEN: Normal BS. Soft. Nondistended. No tenderness to palpation. No guarding. No hernias noted. No splenomegaly. Back: Spine is midline. No ecchymosis. No crepituson palpation. No obvious subluxation of vertebral column. No saddle anesthesia or evidence of cauda equina. /ANORECTAL: Not assessed  MUSKULOSKELETAL: Normal ROM. No acute deformities. No edema. No tenderness to palpate. SKIN: Warm and dry. NEUROLOGICAL:  GCS 15. CN II-XII grossly intact. Strength is 5/5 in all extremities and sensation is intact. PSYCHIATRIC: Normal affect, normal insight and judgement. Alert and oriented x 3.         DIAGNOSTIC RESULTS:     LABS:    Results for orders placed or Microscopic Examination Not Indicated     Urine Type NotGiven     Urine Reflex to Culture Not Indicated    Urine Drug Screen   Result Value Ref Range    Amphetamine Screen, Urine Neg Negative <1000ng/mL    Barbiturate Screen, Ur Neg Negative <200 ng/mL    Benzodiazepine Screen, Urine Neg Negative <200 ng/mL    Cannabinoid Scrn, Ur Neg Negative <50 ng/mL    Cocaine Metabolite Screen, Urine Neg Negative <300 ng/mL    Opiate Scrn, Ur Neg Negative <300 ng/mL    PCP Screen, Urine Neg Negative <25 ng/mL    Methadone Screen, Urine Neg Negative <300 ng/mL    Propoxyphene Scrn, Ur Neg Negative <300 ng/mL    Oxycodone Urine Neg Negative <100 ng/ml    pH, UA 5.5     Drug Screen Comment: see below    Troponin   Result Value Ref Range    Troponin <0.01 <0.01 ng/mL   EKG 12 Lead   Result Value Ref Range    Ventricular Rate 107 BPM    Atrial Rate 107 BPM    P-R Interval 116 ms    QRS Duration 84 ms    Q-T Interval 324 ms    QTc Calculation (Bazett) 432 ms    P Axis 70 degrees    R Axis 58 degrees    T Axis 52 degrees    Diagnosis       Sinus tachycardiaOtherwise normal ECGWhen compared with ECG of 23-DEC-2019 15:41,No significant change was foundConfirmed by Radha Whitfield MD, Emily Haddad ([id number]) on 3/26/2021 4:44:20 PM         RADIOLOGY:  All x-ray studies are viewed/reviewed by me. Formal interpretations per the radiologist are as follows:      XR CHEST PORTABLE   Final Result   Unremarkable chest.                 EKG:  See EKG interpretation by an attending physician.       PROCEDURES:   N/A    CRITICAL CARE TIME:   N/A    CONSULTS:  None      EMERGENCY DEPARTMENT COURSE andDIFFERENTIAL DIAGNOSIS/MDM:   Vitals:    Vitals:    03/26/21 1440 03/26/21 1659 03/26/21 1824 03/26/21 1907   BP: (!) 148/95 129/71 136/73 132/73   Pulse: 107 73 76 88   Resp: 20 15 22 18   Temp: 98.1 °F (36.7 °C) 98.5 °F (36.9 °C)     TempSrc: Oral      SpO2: 99% 100% 98% 100%   Weight: 180 lb (81.6 kg)      Height: 5' 11\" (1.803 m)          Patient wasgiven the following medications:  Medications - No data to display      Patient was evaluated independently by myself with the attending physician available for consultation. Patient presented to the emergency room today with complaints of palpitations, patient states that he had some pain in his shoulder blades. Really did not have actual chest pain. He denied any trauma, discomfort is subsided upon arrival in the ED. Patient work-up today showed a mild leukocytosis of 12.9, patient did have elevated LFTs but does have a diagnosis of hepatitis. Patient troponin negative, EKG was nonischemic. Patient was discharged home in good condition, he was given referral for follow-up as an outpatient. He can return the ED for any worsening symptoms. Patient verbalized understanding of plan and agreed with it. Patient laboratory studies, radiographic imaging, and assessment were all discussed with the patient and/orpatient family. There was shared decision-making between myself as well as the patient and/or their surrogate and we are all in agreement with discharge home. There was an opportunity for questions and all questions were answered tothe best of my ability and to the satisfaction of the patient and/or patient family. FINAL IMPRESSION:      1.  Chest pain, unspecified type          DISPOSITION/PLAN:   DISPOSITION Decision To Discharge      PATIENT REFERRED TO:  Koki Mcfarlane MD  . [facility] 53 [phone]    Call   For follow up      DISCHARGE MEDICATIONS:  Discharge Medication List as of 3/26/2021  6:55 PM                     (Please note thatportions of this note were completed with a voice recognition program.  Efforts were made to edit the dictations, but occasionally words are mis-transcribed.)    SHAYLA Bernal CNP-C (electronicallysigned)        SHAYLA Bernal CNP  03/28/21 9929

## 2021-05-03 ENCOUNTER — HOSPITAL ENCOUNTER (OUTPATIENT)
Age: 36
Discharge: HOME | End: 2021-05-03
Payer: MEDICAID

## 2021-05-03 ENCOUNTER — HOSPITAL ENCOUNTER (OUTPATIENT)
Dept: ULTRASOUND IMAGING | Age: 36
Discharge: HOME | End: 2021-05-03
Payer: MEDICAID

## 2021-05-03 ENCOUNTER — APPOINTMENT (OUTPATIENT)
Dept: ULTRASOUND IMAGING | Age: 36
End: 2021-05-03
Payer: MEDICAID

## 2021-05-03 DIAGNOSIS — B18.2 CHRONIC HEPATITIS C WITHOUT HEPATIC COMA (HCC): ICD-10-CM

## 2021-05-03 LAB
INR BLD: 1.05 (ref 0.86–1.14)
LIPASE: 39 U/L (ref 13–60)
PROTHROMBIN TIME: 12.2 SEC (ref 10–13.2)

## 2021-05-03 PROCEDURE — 80074 ACUTE HEPATITIS PANEL: CPT

## 2021-05-03 PROCEDURE — 82105 ALPHA-FETOPROTEIN SERUM: CPT

## 2021-05-03 PROCEDURE — 87522 HEPATITIS C REVRS TRNSCRPJ: CPT

## 2021-05-03 PROCEDURE — 85610 PROTHROMBIN TIME: CPT

## 2021-05-03 PROCEDURE — 76705 ECHO EXAM OF ABDOMEN: CPT

## 2021-05-03 PROCEDURE — 87902 NFCT AGT GNTYP ALYS HEP C: CPT

## 2021-05-03 PROCEDURE — 83690 ASSAY OF LIPASE: CPT

## 2021-05-03 PROCEDURE — 36415 COLL VENOUS BLD VENIPUNCTURE: CPT

## 2021-05-04 LAB
HAV IGM SER IA-ACNC: ABNORMAL
HEPATITIS B CORE IGM ANTIBODY: ABNORMAL
HEPATITIS B SURFACE ANTIGEN INTERPRETATION: ABNORMAL
HEPATITIS C ANTIBODY INTERPRETATION: REACTIVE

## 2021-05-06 LAB
AFP: 2 UG/L
HCV QNT BY NAAT IU/ML: ABNORMAL
HCV QNT BY NAAT LOG IU/ML: 6.24 LOG IU/ML
INTERPRETATION: DETECTED

## 2021-05-09 LAB — HEPATITIS C GENOTYPE: NORMAL

## 2023-06-06 ENCOUNTER — HOSPITAL ENCOUNTER (EMERGENCY)
Age: 38
Discharge: HOME | End: 2023-06-06
Attending: EMERGENCY MEDICINE
Payer: MEDICAID

## 2023-06-06 ENCOUNTER — APPOINTMENT (OUTPATIENT)
Dept: CT IMAGING | Age: 38
End: 2023-06-06
Payer: MEDICAID

## 2023-06-06 ENCOUNTER — APPOINTMENT (OUTPATIENT)
Dept: ULTRASOUND IMAGING | Age: 38
End: 2023-06-06
Payer: MEDICAID

## 2023-06-06 VITALS
HEIGHT: 71 IN | SYSTOLIC BLOOD PRESSURE: 135 MMHG | HEART RATE: 84 BPM | TEMPERATURE: 99.5 F | WEIGHT: 160 LBS | BODY MASS INDEX: 22.4 KG/M2 | OXYGEN SATURATION: 99 % | RESPIRATION RATE: 16 BRPM | DIASTOLIC BLOOD PRESSURE: 87 MMHG

## 2023-06-06 DIAGNOSIS — R10.9 FLANK PAIN: Primary | ICD-10-CM

## 2023-06-06 DIAGNOSIS — N10 ACUTE PYELONEPHRITIS: ICD-10-CM

## 2023-06-06 LAB
ALBUMIN SERPL-MCNC: 3.8 G/DL (ref 3.4–5)
ALBUMIN/GLOB SERPL: 1 {RATIO} (ref 1.1–2.2)
ALP SERPL-CCNC: 87 U/L (ref 40–129)
ALT SERPL-CCNC: 70 U/L (ref 10–40)
ANION GAP SERPL CALCULATED.3IONS-SCNC: 8 MMOL/L (ref 3–16)
AST SERPL-CCNC: 35 U/L (ref 15–37)
BACTERIA URNS QL MICRO: ABNORMAL /HPF
BASOPHILS # BLD: 0 K/UL (ref 0–0.2)
BASOPHILS NFR BLD: 0 %
BILIRUB SERPL-MCNC: 0.7 MG/DL (ref 0–1)
BILIRUB UR QL STRIP.AUTO: NEGATIVE
BUN SERPL-MCNC: 7 MG/DL (ref 7–20)
CALCIUM SERPL-MCNC: 9.2 MG/DL (ref 8.3–10.6)
CHLORIDE SERPL-SCNC: 98 MMOL/L (ref 99–110)
CLARITY UR: ABNORMAL
CO2 SERPL-SCNC: 29 MMOL/L (ref 21–32)
COLOR UR: YELLOW
CREAT SERPL-MCNC: 1 MG/DL (ref 0.9–1.3)
DEPRECATED RDW RBC AUTO: 12.9 % (ref 12.4–15.4)
EOSINOPHIL # BLD: 0 K/UL (ref 0–0.6)
EOSINOPHIL NFR BLD: 0 %
EPI CELLS #/AREA URNS HPF: ABNORMAL /HPF (ref 0–5)
GFR SERPLBLD CREATININE-BSD FMLA CKD-EPI: >60 ML/MIN/{1.73_M2}
GLUCOSE SERPL-MCNC: 149 MG/DL (ref 70–99)
GLUCOSE UR STRIP.AUTO-MCNC: NEGATIVE MG/DL
HCT VFR BLD AUTO: 45.5 % (ref 40.5–52.5)
HGB BLD-MCNC: 15.5 G/DL (ref 13.5–17.5)
HGB UR QL STRIP.AUTO: ABNORMAL
KETONES UR STRIP.AUTO-MCNC: NEGATIVE MG/DL
LEUKOCYTE ESTERASE UR QL STRIP.AUTO: ABNORMAL
LIPASE SERPL-CCNC: 41 U/L (ref 13–60)
LYMPHOCYTES # BLD: 0.8 K/UL (ref 1–5.1)
LYMPHOCYTES NFR BLD: 4 %
MCH RBC QN AUTO: 32.2 PG (ref 26–34)
MCHC RBC AUTO-ENTMCNC: 34.1 G/DL (ref 31–36)
MCV RBC AUTO: 94.6 FL (ref 80–100)
MONOCYTES # BLD: 1.6 K/UL (ref 0–1.3)
MONOCYTES NFR BLD: 8 %
NEUTROPHILS # BLD: 17.8 K/UL (ref 1.7–7.7)
NEUTROPHILS NFR BLD: 86 %
NEUTS BAND NFR BLD MANUAL: 2 % (ref 0–7)
NITRITE UR QL STRIP.AUTO: NEGATIVE
PH UR STRIP.AUTO: 6.5 [PH] (ref 5–8)
PLATELET # BLD AUTO: 219 K/UL (ref 135–450)
PLATELET BLD QL SMEAR: ADEQUATE
PMV BLD AUTO: 8.4 FL (ref 5–10.5)
POIKILOCYTOSIS BLD QL SMEAR: ABNORMAL
POTASSIUM SERPL-SCNC: 3.6 MMOL/L (ref 3.5–5.1)
PROT SERPL-MCNC: 7.6 G/DL (ref 6.4–8.2)
PROT UR STRIP.AUTO-MCNC: 100 MG/DL
RBC # BLD AUTO: 4.81 M/UL (ref 4.2–5.9)
RBC #/AREA URNS HPF: ABNORMAL /HPF (ref 0–4)
SLIDE REVIEW: ABNORMAL
SODIUM SERPL-SCNC: 135 MMOL/L (ref 136–145)
SP GR UR STRIP.AUTO: <=1.005 (ref 1–1.03)
STOMATOCYTES BLD QL SMEAR: ABNORMAL
UA COMPLETE W REFLEX CULTURE PNL UR: YES
UA DIPSTICK W REFLEX MICRO PNL UR: YES
URN SPEC COLLECT METH UR: ABNORMAL
UROBILINOGEN UR STRIP-ACNC: 1 E.U./DL
WBC # BLD AUTO: 20.2 K/UL (ref 4–11)
WBC #/AREA URNS HPF: >100 /HPF (ref 0–5)

## 2023-06-06 PROCEDURE — 36415 COLL VENOUS BLD VENIPUNCTURE: CPT

## 2023-06-06 PROCEDURE — 6360000002 HC RX W HCPCS: Performed by: EMERGENCY MEDICINE

## 2023-06-06 PROCEDURE — 87086 URINE CULTURE/COLONY COUNT: CPT

## 2023-06-06 PROCEDURE — 85025 COMPLETE CBC W/AUTO DIFF WBC: CPT

## 2023-06-06 PROCEDURE — 74177 CT ABD & PELVIS W/CONTRAST: CPT

## 2023-06-06 PROCEDURE — 87186 SC STD MICRODIL/AGAR DIL: CPT

## 2023-06-06 PROCEDURE — 87077 CULTURE AEROBIC IDENTIFY: CPT

## 2023-06-06 PROCEDURE — 83690 ASSAY OF LIPASE: CPT

## 2023-06-06 PROCEDURE — 80053 COMPREHEN METABOLIC PANEL: CPT

## 2023-06-06 PROCEDURE — 6370000000 HC RX 637 (ALT 250 FOR IP): Performed by: EMERGENCY MEDICINE

## 2023-06-06 PROCEDURE — 6360000004 HC RX CONTRAST MEDICATION: Performed by: EMERGENCY MEDICINE

## 2023-06-06 PROCEDURE — 96365 THER/PROPH/DIAG IV INF INIT: CPT

## 2023-06-06 PROCEDURE — 93975 VASCULAR STUDY: CPT

## 2023-06-06 PROCEDURE — 99285 EMERGENCY DEPT VISIT HI MDM: CPT

## 2023-06-06 PROCEDURE — 76870 US EXAM SCROTUM: CPT

## 2023-06-06 PROCEDURE — 81001 URINALYSIS AUTO W/SCOPE: CPT

## 2023-06-06 PROCEDURE — 2580000003 HC RX 258: Performed by: EMERGENCY MEDICINE

## 2023-06-06 RX ORDER — DOXYCYCLINE HYCLATE 100 MG
100 TABLET ORAL ONCE
Status: COMPLETED | OUTPATIENT
Start: 2023-06-06 | End: 2023-06-06

## 2023-06-06 RX ORDER — CEFDINIR 300 MG/1
300 CAPSULE ORAL 2 TIMES DAILY
Qty: 14 CAPSULE | Refills: 0 | Status: SHIPPED | OUTPATIENT
Start: 2023-06-06 | End: 2023-06-13

## 2023-06-06 RX ORDER — DOXYCYCLINE HYCLATE 100 MG
100 TABLET ORAL 2 TIMES DAILY
Qty: 14 TABLET | Refills: 0 | Status: SHIPPED | OUTPATIENT
Start: 2023-06-06 | End: 2023-06-13

## 2023-06-06 RX ADMIN — CEFTRIAXONE SODIUM 1000 MG: 1 INJECTION, POWDER, FOR SOLUTION INTRAMUSCULAR; INTRAVENOUS at 18:45

## 2023-06-06 RX ADMIN — DOXYCYCLINE HYCLATE 100 MG: 100 TABLET, COATED ORAL at 19:19

## 2023-06-06 RX ADMIN — IOPAMIDOL 75 ML: 755 INJECTION, SOLUTION INTRAVENOUS at 17:41

## 2023-06-06 ASSESSMENT — PAIN DESCRIPTION - FREQUENCY: FREQUENCY: CONTINUOUS

## 2023-06-06 ASSESSMENT — PAIN DESCRIPTION - DIRECTION: RADIATING_TOWARDS: TO GROIN

## 2023-06-06 ASSESSMENT — LIFESTYLE VARIABLES
HOW OFTEN DO YOU HAVE A DRINK CONTAINING ALCOHOL: 2-4 TIMES A MONTH
HOW MANY STANDARD DRINKS CONTAINING ALCOHOL DO YOU HAVE ON A TYPICAL DAY: 1 OR 2

## 2023-06-06 ASSESSMENT — PAIN DESCRIPTION - LOCATION: LOCATION: FLANK

## 2023-06-06 ASSESSMENT — PAIN DESCRIPTION - PAIN TYPE: TYPE: ACUTE PAIN

## 2023-06-06 ASSESSMENT — PAIN SCALES - GENERAL: PAINLEVEL_OUTOF10: 4

## 2023-06-06 ASSESSMENT — PAIN DESCRIPTION - ORIENTATION: ORIENTATION: RIGHT

## 2023-06-06 ASSESSMENT — PAIN DESCRIPTION - DESCRIPTORS: DESCRIPTORS: ACHING;CRAMPING

## 2023-06-06 ASSESSMENT — PAIN - FUNCTIONAL ASSESSMENT: PAIN_FUNCTIONAL_ASSESSMENT: 0-10

## 2023-06-06 NOTE — DISCHARGE INSTRUCTIONS
Patient presented with acute infection urinary tract infection. Please take the antibiotics as directed. Please return for any worsening fever, nausea, vomiting or worsening redness in the groin area. Take the antibiotics for the entire course. Schedule follow-up with urology and a primary care doctor. A referral for a primary care doctor has also been placed.

## 2023-06-06 NOTE — ED PROVIDER NOTES
in grammar, punctuation, and spelling, as well as words and phrases that may be inappropriate.  If there are any questions or concerns please feel free to contact the dictating provider for clarification.)    MD oJrge Lyon MD  06/06/23 8000

## 2023-06-09 LAB
BACTERIA UR CULT: ABNORMAL
ORGANISM: ABNORMAL